# Patient Record
Sex: MALE | Race: WHITE
[De-identification: names, ages, dates, MRNs, and addresses within clinical notes are randomized per-mention and may not be internally consistent; named-entity substitution may affect disease eponyms.]

---

## 2017-02-13 ENCOUNTER — HOSPITAL ENCOUNTER (OUTPATIENT)
Dept: HOSPITAL 47 - LABWHC1 | Age: 62
Discharge: HOME | End: 2017-02-13
Payer: COMMERCIAL

## 2017-02-13 DIAGNOSIS — E78.2: Primary | ICD-10-CM

## 2017-02-13 LAB
ALP SERPL-CCNC: 100 U/L (ref 38–126)
ALT SERPL-CCNC: 30 U/L (ref 21–72)
ANION GAP SERPL CALC-SCNC: 9 MMOL/L
AST SERPL-CCNC: 20 U/L (ref 17–59)
BUN SERPL-SCNC: 17 MG/DL (ref 9–20)
CALCIUM SPEC-MCNC: 9.3 MG/DL (ref 8.4–10.2)
CHLORIDE SERPL-SCNC: 104 MMOL/L (ref 98–107)
CHOLEST SERPL-MCNC: 126 MG/DL (ref ?–200)
CO2 SERPL-SCNC: 30 MMOL/L (ref 22–30)
GLUCOSE SERPL-MCNC: 113 MG/DL (ref 74–99)
HDLC SERPL-MCNC: 47 MG/DL (ref 40–60)
NON-AFRICAN AMERICAN GFR(MDRD): >60
POTASSIUM SERPL-SCNC: 4 MMOL/L (ref 3.5–5.1)
PROT SERPL-MCNC: 6.4 G/DL (ref 6.3–8.2)
SODIUM SERPL-SCNC: 143 MMOL/L (ref 137–145)
TRIGL SERPL-MCNC: 88 MG/DL (ref ?–150)

## 2017-02-13 PROCEDURE — 80061 LIPID PANEL: CPT

## 2017-02-13 PROCEDURE — 36415 COLL VENOUS BLD VENIPUNCTURE: CPT

## 2017-02-13 PROCEDURE — 80053 COMPREHEN METABOLIC PANEL: CPT

## 2018-07-30 ENCOUNTER — HOSPITAL ENCOUNTER (OUTPATIENT)
Dept: HOSPITAL 47 - LABWHC1 | Age: 63
Discharge: HOME | End: 2018-07-30
Payer: COMMERCIAL

## 2018-07-30 DIAGNOSIS — E78.2: Primary | ICD-10-CM

## 2018-07-30 LAB
ALBUMIN SERPL-MCNC: 4.2 G/DL (ref 3.5–5)
ALP SERPL-CCNC: 85 U/L (ref 38–126)
ALT SERPL-CCNC: 32 U/L (ref 21–72)
ANION GAP SERPL CALC-SCNC: 8 MMOL/L
AST SERPL-CCNC: 27 U/L (ref 17–59)
BUN SERPL-SCNC: 16 MG/DL (ref 9–20)
CALCIUM SPEC-MCNC: 9.2 MG/DL (ref 8.4–10.2)
CHLORIDE SERPL-SCNC: 104 MMOL/L (ref 98–107)
CHOLEST SERPL-MCNC: 125 MG/DL (ref ?–200)
CO2 SERPL-SCNC: 29 MMOL/L (ref 22–30)
GLUCOSE SERPL-MCNC: 112 MG/DL (ref 74–99)
HDLC SERPL-MCNC: 41 MG/DL (ref 40–60)
LDLC SERPL CALC-MCNC: 67 MG/DL (ref 0–99)
POTASSIUM SERPL-SCNC: 3.9 MMOL/L (ref 3.5–5.1)
PROT SERPL-MCNC: 6.6 G/DL (ref 6.3–8.2)
SODIUM SERPL-SCNC: 141 MMOL/L (ref 137–145)
TRIGL SERPL-MCNC: 85 MG/DL (ref ?–150)

## 2018-07-30 PROCEDURE — 80061 LIPID PANEL: CPT

## 2018-07-30 PROCEDURE — 36415 COLL VENOUS BLD VENIPUNCTURE: CPT

## 2018-07-30 PROCEDURE — 80053 COMPREHEN METABOLIC PANEL: CPT

## 2018-09-17 ENCOUNTER — HOSPITAL ENCOUNTER (OUTPATIENT)
Dept: HOSPITAL 47 - LABPAT | Age: 63
Discharge: HOME | End: 2018-09-17
Payer: COMMERCIAL

## 2018-09-17 DIAGNOSIS — Z01.812: Primary | ICD-10-CM

## 2018-09-17 LAB
ERYTHROCYTE [DISTWIDTH] IN BLOOD BY AUTOMATED COUNT: 5.3 M/UL (ref 4.3–5.9)
ERYTHROCYTE [DISTWIDTH] IN BLOOD: 13.2 % (ref 11.5–15.5)
HCT VFR BLD AUTO: 48.6 % (ref 39–53)
HGB BLD-MCNC: 15.7 GM/DL (ref 13–17.5)
MCH RBC QN AUTO: 29.7 PG (ref 25–35)
MCHC RBC AUTO-ENTMCNC: 32.4 G/DL (ref 31–37)
MCV RBC AUTO: 91.8 FL (ref 80–100)
PLATELET # BLD AUTO: 315 K/UL (ref 150–450)
WBC # BLD AUTO: 8.3 K/UL (ref 3.8–10.6)

## 2018-09-17 PROCEDURE — 85027 COMPLETE CBC AUTOMATED: CPT

## 2018-09-17 PROCEDURE — 36415 COLL VENOUS BLD VENIPUNCTURE: CPT

## 2018-09-21 ENCOUNTER — HOSPITAL ENCOUNTER (OUTPATIENT)
Dept: HOSPITAL 47 - OR | Age: 63
LOS: 1 days | Discharge: HOME | End: 2018-09-22
Payer: COMMERCIAL

## 2018-09-21 VITALS — RESPIRATION RATE: 18 BRPM

## 2018-09-21 VITALS — BODY MASS INDEX: 31.9 KG/M2

## 2018-09-21 DIAGNOSIS — I25.5: ICD-10-CM

## 2018-09-21 DIAGNOSIS — N40.0: ICD-10-CM

## 2018-09-21 DIAGNOSIS — J44.9: ICD-10-CM

## 2018-09-21 DIAGNOSIS — Z88.6: ICD-10-CM

## 2018-09-21 DIAGNOSIS — K43.6: Primary | ICD-10-CM

## 2018-09-21 DIAGNOSIS — E78.5: ICD-10-CM

## 2018-09-21 DIAGNOSIS — I25.2: ICD-10-CM

## 2018-09-21 DIAGNOSIS — Z79.82: ICD-10-CM

## 2018-09-21 DIAGNOSIS — I11.9: ICD-10-CM

## 2018-09-21 DIAGNOSIS — Z86.73: ICD-10-CM

## 2018-09-21 DIAGNOSIS — I25.10: ICD-10-CM

## 2018-09-21 DIAGNOSIS — Z79.899: ICD-10-CM

## 2018-09-21 DIAGNOSIS — E66.9: ICD-10-CM

## 2018-09-21 DIAGNOSIS — Z79.02: ICD-10-CM

## 2018-09-21 DIAGNOSIS — G47.33: ICD-10-CM

## 2018-09-21 DIAGNOSIS — Z99.89: ICD-10-CM

## 2018-09-21 PROCEDURE — 49653: CPT

## 2018-09-21 RX ADMIN — TAMSULOSIN HYDROCHLORIDE SCH: 0.4 CAPSULE ORAL at 21:45

## 2018-09-21 RX ADMIN — HYDROCODONE BITARTRATE AND ACETAMINOPHEN PRN EACH: 5; 325 TABLET ORAL at 22:25

## 2018-09-21 NOTE — P.GSHP
History of Present Illness


H&P Date: 09/21/18











CHIEF COMPLAINT:  Ventral hernia





HISTORY OF PRESENT ILLNESS: The patient is a 63-year-old male who


presents with a history of swelling and pain along the abdomen from a hernia.  


Now he presents for surgical intervention.





PAST MEDICAL HISTORY: 


Please see list.





PAST SURGICAL HISTORY: 


Please see list.





MEDICATIONS: 


Please see list.





ALLERGIES:  Please see list. 





SOCIAL HISTORY: No illicit drug use





FAMILY HISTORY: No reports of Crohn disease or ulcerative colitis. 





REVIEW OF ORGAN SYSTEMS: 


CONSTITUTIONAL: No reports of fevers or chills. No reports of weight


loss despite prior attempts. 


GI:  Denies any blood in stools or constipation. 





PHYSICAL EXAM: 


VITAL SIGNS:  Stable


GENERAL: Well-developed pleasant male in no acute distress. 


HEENT: No scleral icterus. Extraocular movements grossly


intact. Moist buccal mucosa. 


NECK: Supple without lymphadenopathy. 


CHEST: Unlabored respirations. Equal bilateral excursions. 


CARDIOVASCULAR: Regular rate and rhythm. Distal 2+ pulses. 


ABDOMEN: Soft, nondistended.  Palpable defect of the abdomen.  No peritoneal 

signs.


MUSCULOSKELETAL: No clubbing, cyanosis, or edema. 





ASSESSMENT: 


1.  Ventral hernia





PLAN: 


1.  Recommend proceeding with robotic ventral hernia repair with mesh.


2.  Benefits and risks of surgical intervention was discussed including 

possibility of open technique.


3.  DVT prophylaxis.


4.  Antibiotic prophylaxis.





Past Medical History


Past Medical History: Coronary Artery Disease (CAD), CVA/TIA, Hyperlipidemia, 

Hypertension, Myocardial Infarction (MI)


Additional Past Medical History / Comment(s): AUG 28, 14.


Last Myocardial Infarction Date:: UNKNOWN


History of Any Multi-Drug Resistant Organisms: None Reported


Past Surgical History: Tonsillectomy


Past Anesthesia/Blood Transfusion Reactions: No Reported Reaction


Past Alcohol Use History: None Reported





- Past Family History


  ** Mother


Family Medical History: Deep Vein Thrombosis (DVT)





Medications and Allergies


 Home Medications











 Medication  Instructions  Recorded  Confirmed  Type


 


Aspirin EC [Ecotrin] 81 mg PO HS 09/30/14 09/21/18 History


 


Atorvastatin [Lipitor] 20 mg PO HS 09/30/14 09/21/18 History


 


Losartan Potassium 100 mg PO QAM 09/30/14 09/21/18 History


 


Metoprolol Succinate 25 mg PO QAM 09/30/14 09/21/18 History


 


amLODIPine BESYLATE [Norvasc] 10 mg PO HS 09/30/14 09/21/18 History


 


Cholecalciferol (Vitamin D3) 2,000 unit PO DAILY 05/01/15 09/21/18 History





[Vitamin D]    


 


Tamsulosin HCl 0.4 mg PO DAILY 05/01/15 09/21/18 History


 


Ubidecarenone [Co Q-10] 100 mg PO HS 05/01/15 09/21/18 History


 


Vitamin B Complex 1 cap PO DAILY 05/01/15 09/21/18 History


 


Clopidogrel [Plavix] 75 mg PO DAILY 09/13/18 09/21/18 History


 


Glucosamine Sulfate 500 mg PO DAILY 09/13/18 09/21/18 History


 


Isosorbide Mononitrate [Isosorbide 30 mg PO DAILY 09/13/18 09/21/18 History





Mononitrate ER]    


 


Updraft DAILY PRN 09/21/18  History











 Allergies











Allergy/AdvReac Type Severity Reaction Status Date / Time


 


naproxen sodium [From Aleve] Allergy  Rash/Hives Verified 09/21/18 06:14














Surgical - Exam


 Vital Signs











Temp Pulse Resp BP Pulse Ox


 


 97.8 F   59 L  16   102/69   95 


 


 09/21/18 06:10  09/21/18 06:10  09/21/18 06:10  09/21/18 06:10  09/21/18 06:10

## 2018-09-21 NOTE — P.OP
Date of Procedure: 09/21/18


Description of Procedure: 








SURGEON:  HILARY AYALA MD





PREOPERATIVE DIAGNOSES:  


1.  Initial umbilical ventral hernia


2.  Ischemic cardiomyopathy


3.  Chronic obstructive pulmonary disease


4.  Hypertensive heart disease


5.  Prostatic hypertrophy disorder


6.  Hyperlipidemia


7.  Obesity due to excess calories, BMI 32.0





POSTOPERATIVE DIAGNOSES:  


1.  Initial umbilical ventral hernia, incarcerated 3 cm.


2.  Ischemic cardiomyopathy


3.  Chronic obstructive pulmonary disease


4.  Hypertensive heart disease


5.  Prostatic hypertrophy disorder


6.  Hyperlipidemia


7.  Obesity due to excess calories, BMI 32.0





OPERATION:       


1.  Robotic-assisted da Luis Xi laparoscopic repair of 3 cm initial 

incarcerated umbilical ventral hernia mesh, ventralight ST mesh 11.4 cm





ANESTHESIA: General with local


ESTIMATED BLOOD LOSS:  5 mL.


SPECIMENS: None.


COMPLICATIONS:  None. 





INDICATIONS: The patient is a 63-year-old male who presents with pain 


along the epigastrium including periumbilical pain. Surgical intervention with 


laparoscopic versus robotic and open techniques were reviewed. 


Placement of mesh was also reviewed. Benefits and risks were thoroughly 


described. Informed consent was obtained.  





DESCRIPTION OF PROCEDURE: The patient was brought into the operating 


room and laid in supine position. After general induction, the abdomen 


had been prepped and draped in standard sterile fashion. Ioban draping 


was also placed. Prior to incision, a timeout protocol was confirmed 


with surgical team regarding the patient's name including procedures to be 


performed. The robot was primed prior to the procedure.  





A field block using local anesthetis was placed along hernia site including the 

proposed port sites.





Initial incision was made with an  #11 blade along the left upper quadrant. A 0 

degree 5 mm laparoscopic trocar 


entry was performed. An incarcerated ventral hernia of the umbilicus was 

identified.


A 8 mm trocar was placed along the right lateral abdominal wall approximately 

12 cm lateral to the lower midline. 


An 8 mm port was placed along the right lower quadrant under direct 

localization. An 8-mm port was along the right lateral mid-abdominal wall.  

Placements of the ports were 15 cm from the target anatomy and 10 cm apart.  





The [employer] robot was previously primed, prepped and draped then docked 

along the left side of the patient.





I then sat at the robot [employer] console where working arms of the robot


including Bovie cautery connected to robotic scissors, needle , and 

graspers placed by the assistant.





The incarcerated contents was reduced as the peritoneal fat was cleaned from 

the abdominal wall.


Next, hemostasis was checked with cautery.  The hernia defect was oversewn 

using #1 Stratafix with imbrication 3.





Next, ventralight ST mesh 11.4 cm was placed with the rough side towards the 

abdominal wall.  2-0 VLOC 9 inch sutures were used to fixate the mesh. 





A final endoscopic imaging was obtained. 





All instruments and pneumoperitoneum were evacuated from the abdominal cavity.  





The da Luis Xi robot was undocked from the patient. I re-scrubbed into the 

case for closure of incisions.  





The incisions were reapproximated using 4-0 Monocryl in an  interrupted 

subcuticular fashion. Liquid glue was applied to the skin after cleansing the 

skin with normal saline and dilute hydrogen peroxide.





A dressing was placed at the umbilicus ith three(3) 4 x 4 gauze followed by 

Tegaderm.  An abdominal binder was placed. 





At the end of the procedure, needle, sponge, and instrument count had 


been verified correct by surgical technician. The patient was taken to 


the postanesthesia care unit in stable condition.





FINDINGS: 


1.  Ventral/Umbilical incarcerated hernia of the epigastrium, 3 x 3 cm


2.  Console time 28 minutes





Plan - Discharge Summary


New Discharge Prescriptions: 


No Action


   Aspirin EC [Ecotrin] 81 mg PO HS


   amLODIPine BESYLATE [Norvasc] 10 mg PO HS


   Atorvastatin [Lipitor] 20 mg PO HS


   Metoprolol Succinate 25 mg PO QAM


   Losartan Potassium 100 mg PO QAM


   Tamsulosin HCl 0.4 mg PO DAILY


   Vitamin B Complex 1 cap PO DAILY


   Ubidecarenone [Co Q-10] 100 mg PO HS


   Cholecalciferol (Vitamin D3) [Vitamin D] 2,000 unit PO DAILY


   Isosorbide Mononitrate [Isosorbide Mononitrate ER] 30 mg PO DAILY


   Clopidogrel [Plavix] 75 mg PO DAILY


   Glucosamine Sulfate 500 mg PO DAILY


   Updraft DAILY PRN


     PRN Reason: Dyspnea


Discharge Medication List





Aspirin EC [Ecotrin] 81 mg PO HS 09/30/14 [History]


Atorvastatin [Lipitor] 20 mg PO HS 09/30/14 [History]


Losartan Potassium 100 mg PO QAM 09/30/14 [History]


Metoprolol Succinate 25 mg PO QAM 09/30/14 [History]


amLODIPine BESYLATE [Norvasc] 10 mg PO HS 09/30/14 [History]


Cholecalciferol (Vitamin D3) [Vitamin D] 2,000 unit PO DAILY 05/01/15 [History]


Tamsulosin HCl 0.4 mg PO DAILY 05/01/15 [History]


Ubidecarenone [Co Q-10] 100 mg PO HS 05/01/15 [History]


Vitamin B Complex 1 cap PO DAILY 05/01/15 [History]


Clopidogrel [Plavix] 75 mg PO DAILY 09/13/18 [History]


Glucosamine Sulfate 500 mg PO DAILY 09/13/18 [History]


Isosorbide Mononitrate [Isosorbide Mononitrate ER] 30 mg PO DAILY 09/13/18 [

History]


Updraft DAILY PRN 09/21/18 [History]

## 2018-09-22 VITALS — HEART RATE: 80 BPM

## 2018-09-22 VITALS — TEMPERATURE: 97.8 F | DIASTOLIC BLOOD PRESSURE: 83 MMHG | SYSTOLIC BLOOD PRESSURE: 121 MMHG

## 2018-09-22 RX ADMIN — HYDROCODONE BITARTRATE AND ACETAMINOPHEN PRN EACH: 5; 325 TABLET ORAL at 13:30

## 2018-09-22 RX ADMIN — TAMSULOSIN HYDROCHLORIDE SCH MG: 0.4 CAPSULE ORAL at 09:57

## 2018-09-22 RX ADMIN — HYDROCODONE BITARTRATE AND ACETAMINOPHEN PRN EACH: 5; 325 TABLET ORAL at 06:27

## 2018-09-22 NOTE — P.DS
Providers


Date of admission: 





09/21/18


Expected date of discharge: 09/22/18


Attending physician: 


Hillary Cyr





Primary care physician: 


Brian Ramon








- Discharge Diagnosis(es)


(1) Incarcerated ventral hernia


Current Visit: Yes   Status: Acute   





(2) COPD (chronic obstructive pulmonary disease)


Current Visit: Yes   Status: Acute   





(3) Prostate disease


Current Visit: Yes   Status: Acute   





(4) Ischemic cardiomyopathy


Current Visit: Yes   Status: Acute   


Hospital Course: 





POSTOPERATIVE DIAGNOSES:  


1.  Initial umbilical ventral hernia, incarcerated 3 cm.


2.  Ischemic cardiomyopathy


3.  Chronic obstructive pulmonary disease


4.  Hypertensive heart disease


5.  Prostatic hypertrophy disorder


6.  Hyperlipidemia


7.  Obesity due to excess calories, BMI 32.0





COURSE: The patient is a 63-year-old male who presents with pain 


along the epigastrium including periumbilical pain. Surgical intervention with 


laparoscopic versus robotic and open techniques were reviewed. 


Placement of mesh was also reviewed. Benefits and risks were thoroughly 


described. Informed consent was obtained.  





Postprocedure, his pain was poorly controlled.  He also had difficulty voiding.

  He was placed in outpatient status for prolonged postoperative recovery.  

Prior to discharge, he was voiding spontaneously.  Patient has history of 

chronic COPD.  He will follow up with his primary care provider as well.  Follow

-up in the office 3 days.








Procedures: 








OPERATION:       


1.  Robotic-assisted da Luis Xi laparoscopic repair of 3 cm initial 

incarcerated umbilical ventral hernia mesh, ventralight ST mesh 11.4 cm





ANESTHESIA: General with local


ESTIMATED BLOOD LOSS:  5 mL.


SPECIMENS: None.


COMPLICATIONS:  None. 





Patient Condition at Discharge: Stable





Plan - Discharge Summary


Discharge Rx Participant: Yes


New Discharge Prescriptions: 


New


   HYDROcodone/APAP 5-325MG [Norco 5-325] 1 tab PO Q6HR PRN 3 Days #12 tab


     PRN Reason: Pain





No Action


   Aspirin EC [Ecotrin] 81 mg PO HS


   amLODIPine BESYLATE [Norvasc] 10 mg PO HS


   Atorvastatin [Lipitor] 20 mg PO HS


   Metoprolol Succinate 25 mg PO QAM


   Losartan Potassium 100 mg PO QAM


   Tamsulosin HCl 0.4 mg PO DAILY


   Vitamin B Complex 1 cap PO DAILY


   Ubidecarenone [Co Q-10] 100 mg PO HS


   Cholecalciferol (Vitamin D3) [Vitamin D] 2,000 unit PO DAILY


   Isosorbide Mononitrate [Isosorbide Mononitrate ER] 30 mg PO DAILY


   Clopidogrel [Plavix] 75 mg PO DAILY


   Glucosamine Sulfate 500 mg PO DAILY


   Updraft DAILY PRN


     PRN Reason: Dyspnea


Discharge Medication List





Aspirin EC [Ecotrin] 81 mg PO HS 09/30/14 [History]


Atorvastatin [Lipitor] 20 mg PO HS 09/30/14 [History]


Losartan Potassium 100 mg PO QAM 09/30/14 [History]


Metoprolol Succinate 25 mg PO QAM 09/30/14 [History]


amLODIPine BESYLATE [Norvasc] 10 mg PO HS 09/30/14 [History]


Cholecalciferol (Vitamin D3) [Vitamin D] 2,000 unit PO DAILY 05/01/15 [History]


Tamsulosin HCl 0.4 mg PO DAILY 05/01/15 [History]


Ubidecarenone [Co Q-10] 100 mg PO HS 05/01/15 [History]


Vitamin B Complex 1 cap PO DAILY 05/01/15 [History]


Clopidogrel [Plavix] 75 mg PO DAILY 09/13/18 [History]


Glucosamine Sulfate 500 mg PO DAILY 09/13/18 [History]


Isosorbide Mononitrate [Isosorbide Mononitrate ER] 30 mg PO DAILY 09/13/18 [

History]


HYDROcodone/APAP 5-325MG [Norco 5-325] 1 tab PO Q6HR PRN 3 Days #12 tab 09/21/ 18 [Rx]


Updraft DAILY PRN 09/21/18 [History]








Follow up Appointment(s)/Referral(s): 


Hillary Cyr MD [STAFF PHYSICIAN] - 09/24/18


Patient Instructions/Handouts:  How to Use an Incentive Spirometer (ED), How to 

Use an Incentive Spirometer (DC), How to Use an Incentive Spirometer (GEN), 

Laparoscopic Herniorrhaphy (DC), Abdominal Binder (DC)


Activity/Diet/Wound Care/Special Instructions: 


No lifting for 4 pounds in 4 weeks.  May shower. Do not remove dressing.  Wear 

abdominal binder at all times except for showering.


Discharge Disposition: HOME SELF-CARE

## 2018-09-22 NOTE — P.PN
Subjective


Progress Note Date: 09/21/18











HISTORY OF PRESENT ILLNESS:  The patient is a 63-year-old gentleman who had a 

ventral hernia repair.  Discharge was held secondary to inability to urinate 

and intraoperative low tidal volume upon induction of anesthesia.  Patient has 

history of COPD Baseline.  At the time of my assessment, pain was controlled.  

Patient was pending spontaneously voidance of urine.  He has history of 

prostate disorder.  





PHYSICAL EXAM: 


GENERAL: Well-developed in no acute distress.  


HEENT: No scleral icterus.  Extraocular movements grossly intact.  Hears 

conversational speech.  No nasal drainage.


NECK: Supple without lymphadenopathy. 


CHEST: Nonlabored respirations with equal bilateral excursions. 


CARDIOVASCULAR: Regular rate and regular rhythm. Distal 2+ pulses. 


ABDOMEN: Obese, soft, nontender, nondistended. 


MUSCULOSKELETAL: No clubbing, cyanosis. Gross strength 5/5 distal lower 

extremities. 2+ pre-tibial pitting edema.


NEURO: No focal or lateralizing signs. Cranial nerves 2 through 12 grossly 

within normal limits. 


PSYCH: Appropriate affect. Alert and oriented to person, place and time.





ASSESSMENT:


1.  Status post ventral hernia repair, robotic


2.  Moderate to severe COPD


3.  Prostatic disorder





PLAN:


1.  Discharge home pending spontaneous void


2.  Pulmonary consultation obtained for moderate severe COPD





Objective





- Vital Signs


Vital signs: 


 Vital Signs











Temp  97.6 F   09/21/18 23:26


 


Pulse  80   09/21/18 23:26


 


Resp  18   09/21/18 23:26


 


BP  136/82   09/21/18 23:26


 


Pulse Ox  93 L  09/21/18 23:26








 Intake & Output











 09/21/18 09/22/18 09/22/18





 18:59 06:59 18:59


 


Intake Total 3588 1750 


 


Output Total 505 2980 


 


Balance 3083 -1230 


 


Weight 87.09 kg  


 


Intake:   


 


  IV 3050  


 


  Intake, IV Titration  1750 





  Amount   


 


    Sodium Chloride 0.9% 1,  750 





    000 ml @ 50 mls/hr IV .   





    Q20H [facility] Rx#:[id number]   


 


    Sodium Chloride 0.9% 1,  1000 





    000 ml @ 999 mls/hr IV .   





    Q1H1M [facility] Rx#:[id number]   


 


  Oral 538  


 


Output:   


 


  Urine 500 2980 


 


    Straight 500  


 


  Estimated Blood Loss 5  


 


Other:   


 


  # Voids  4

## 2019-02-21 ENCOUNTER — HOSPITAL ENCOUNTER (OUTPATIENT)
Dept: HOSPITAL 47 - LABWHC1 | Age: 64
Discharge: HOME | End: 2019-02-21
Attending: INTERNAL MEDICINE
Payer: COMMERCIAL

## 2019-02-21 DIAGNOSIS — E78.2: Primary | ICD-10-CM

## 2019-02-21 LAB
ALBUMIN SERPL-MCNC: 4.1 G/DL (ref 3.8–4.9)
ALBUMIN/GLOB SERPL: 2.16 G/DL (ref 1.6–3.17)
ALP SERPL-CCNC: 105 U/L (ref 41–126)
ALT SERPL-CCNC: 23 U/L (ref 10–49)
ANION GAP SERPL CALC-SCNC: 9.9 MMOL/L (ref 4–12)
AST SERPL-CCNC: 26 U/L (ref 14–35)
BUN SERPL-SCNC: 18 MG/DL (ref 9–27)
CALCIUM SPEC-MCNC: 9.5 MG/DL (ref 8.7–10.3)
CHLORIDE SERPL-SCNC: 102 MMOL/L (ref 96–109)
CHOLEST SERPL-MCNC: 142 MG/DL (ref 0–200)
CO2 SERPL-SCNC: 26.1 MMOL/L (ref 21.6–31.8)
GLOBULIN SER CALC-MCNC: 1.9 G/DL (ref 1.6–3.3)
GLUCOSE SERPL-MCNC: 96 MG/DL (ref 70–110)
HDLC SERPL-MCNC: 40 MG/DL (ref 40–60)
LDLC SERPL CALC-MCNC: 75.6 MG/DL (ref 0–131)
POTASSIUM SERPL-SCNC: 4.5 MMOL/L (ref 3.5–5.5)
PROT SERPL-MCNC: 6 G/DL (ref 6.2–8.2)
SODIUM SERPL-SCNC: 138 MMOL/L (ref 135–145)
TRIGL SERPL-MCNC: 132 MG/DL (ref 0–149)
VLDLC SERPL CALC-MCNC: 26.4 MG/DL (ref 5–40)

## 2019-02-21 PROCEDURE — 80053 COMPREHEN METABOLIC PANEL: CPT

## 2019-02-21 PROCEDURE — 36415 COLL VENOUS BLD VENIPUNCTURE: CPT

## 2019-02-21 PROCEDURE — 80061 LIPID PANEL: CPT

## 2019-06-20 ENCOUNTER — HOSPITAL ENCOUNTER (OUTPATIENT)
Dept: HOSPITAL 47 - LABWHC1 | Age: 64
Discharge: HOME | End: 2019-06-20
Attending: INTERNAL MEDICINE
Payer: COMMERCIAL

## 2019-06-20 DIAGNOSIS — E78.2: Primary | ICD-10-CM

## 2019-06-20 LAB
ALT SERPL-CCNC: 24 U/L (ref 10–49)
AST SERPL-CCNC: 27 U/L (ref 14–35)
CHOLEST SERPL-MCNC: 134 MG/DL (ref 0–200)
HDLC SERPL-MCNC: 43 MG/DL (ref 40–60)
LDLC SERPL CALC-MCNC: 76.4 MG/DL (ref 0–131)
TRIGL SERPL-MCNC: 73 MG/DL (ref 0–149)
VLDLC SERPL CALC-MCNC: 14.6 MG/DL (ref 5–40)

## 2019-06-20 PROCEDURE — 84460 ALANINE AMINO (ALT) (SGPT): CPT

## 2019-06-20 PROCEDURE — 36415 COLL VENOUS BLD VENIPUNCTURE: CPT

## 2019-06-20 PROCEDURE — 84450 TRANSFERASE (AST) (SGOT): CPT

## 2019-06-20 PROCEDURE — 80061 LIPID PANEL: CPT

## 2019-09-14 ENCOUNTER — HOSPITAL ENCOUNTER (EMERGENCY)
Dept: HOSPITAL 47 - EC | Age: 64
Discharge: HOME | End: 2019-09-14
Payer: COMMERCIAL

## 2019-09-14 VITALS
HEART RATE: 57 BPM | SYSTOLIC BLOOD PRESSURE: 124 MMHG | TEMPERATURE: 97.9 F | DIASTOLIC BLOOD PRESSURE: 79 MMHG | RESPIRATION RATE: 18 BRPM

## 2019-09-14 DIAGNOSIS — I25.2: ICD-10-CM

## 2019-09-14 DIAGNOSIS — Z79.02: ICD-10-CM

## 2019-09-14 DIAGNOSIS — I25.10: ICD-10-CM

## 2019-09-14 DIAGNOSIS — Z88.6: ICD-10-CM

## 2019-09-14 DIAGNOSIS — S83.91XA: Primary | ICD-10-CM

## 2019-09-14 DIAGNOSIS — Z86.73: ICD-10-CM

## 2019-09-14 DIAGNOSIS — Z79.82: ICD-10-CM
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
## 2019-09-14 DIAGNOSIS — I10: ICD-10-CM

## 2019-09-14 DIAGNOSIS — E78.5: ICD-10-CM

## 2019-09-14 DIAGNOSIS — Z87.891: ICD-10-CM
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
## 2019-09-14 DIAGNOSIS — Z79.899: ICD-10-CM

## 2019-09-14 PROCEDURE — 99283 EMERGENCY DEPT VISIT LOW MDM: CPT

## 2019-09-14 PROCEDURE — 73562 X-RAY EXAM OF KNEE 3: CPT

## 2019-09-14 PROCEDURE — 96372 THER/PROPH/DIAG INJ SC/IM: CPT

## 2019-09-14 NOTE — XR
EXAMINATION TYPE: XR knee complete RT , 3 VIEWS

 

DATE OF EXAM ORDERED: 9/14/2019

 

HISTORY: twist/fallpain.

 

COMPARISON: None.

 

FINDINGS:  No fracture or dislocation is seen. There is fullness in the suprapatellar region suggesti
ve of an effusion.

 

IMPRESSION: 

1. NO ACUTE OSSEOUS LESION.

2. I SUSPECT A SMALL JOINT EFFUSION.

## 2019-09-14 NOTE — ED
General Adult HPI





- General


Chief complaint: Fall


Stated complaint: fall, knee pain


Time Seen by Provider: 09/14/19 11:12


Source: patient, RN notes reviewed


Mode of arrival: ambulatory


Limitations: no limitations





- History of Present Illness


Initial comments: 





Patient is a pleasant 64-year-old male presents emergency Department with 

complaints of right knee discomfort.  Patient had just slipped and fallen 

yesterday.  Patient slipped on wet floor going down the stairs.  Patient twisted

his knee.  Patient does not believe she really landed on his knee.  Patient did 

lightly strike his head.  No headache.  No loss of consciousness.  No blood 

thinners.  No weakness or confusion.  Patient states he is able to ambulate on 

his right knee however is painful.  No history of significant injury to this 

area previously.  Discomfort is mild at rest but severe with movement.





- Related Data


                                Home Medications











 Medication  Instructions  Recorded  Confirmed


 


Aspirin EC [Ecotrin] 81 mg PO HS 09/30/14 09/14/19


 


Atorvastatin [Lipitor] 20 mg PO HS 09/30/14 09/14/19


 


Losartan Potassium 100 mg PO HS 09/30/14 09/14/19


 


Metoprolol Succinate 25 mg PO HS 09/30/14 09/14/19


 


amLODIPine BESYLATE [Norvasc] 10 mg PO HS 09/30/14 09/14/19


 


Cholecalciferol (Vitamin D3) 2,000 unit PO HS 05/01/15 09/14/19





[Vitamin D]   


 


Tamsulosin HCl 0.4 mg PO HS 05/01/15 09/14/19


 


Ubidecarenone [Co Q-10] 100 mg PO HS 05/01/15 09/14/19


 


Vitamin B Complex 1 cap PO HS 05/01/15 09/14/19


 


Clopidogrel [Plavix] 75 mg PO DAILY 09/13/18 09/21/18


 


Glucosamine Sulfate 500 mg PO DAILY 09/13/18 09/21/18


 


Isosorbide Mononitrate [Isosorbide 30 mg PO HS 09/13/18 09/14/19





Mononitrate ER]   


 


Updraft DAILY PRN 09/21/18 


 


Tiotropium Br/Olodaterol HCl 1 spray INHALATION RT-BID 09/14/19 09/14/19





[Stiolto Respimat Inhal Spray]   








                                  Previous Rx's











 Medication  Instructions  Recorded


 


HYDROcodone/APAP 5-325MG [Norco 1 tab PO Q6HR PRN 3 Days #12 tab 09/21/18





5-325]  


 


Ibuprofen [Motrin] 600 mg PO Q6HR PRN #20 tab 09/14/19











                                    Allergies











Allergy/AdvReac Type Severity Reaction Status Date / Time


 


naproxen sodium [From Aleve] Allergy  Rash/Hives Verified 09/14/19 11:48














Review of Systems


ROS Statement: 


Those systems with pertinent positive or pertinent negative responses have been 

documented in the HPI.





ROS Other: All systems not noted in ROS Statement are negative.


Constitutional: Denies: fever


Eyes: Denies: eye pain


ENT: Denies: ear pain


Respiratory: Denies: cough


Cardiovascular: Denies: chest pain


Endocrine: Denies: fatigue


Gastrointestinal: Denies: abdominal pain


Genitourinary: Denies: dysuria


Musculoskeletal: Denies: back pain


Skin: Denies: rash


Neurological: Denies: weakness





Past Medical History


Past Medical History: Coronary Artery Disease (CAD), CVA/TIA, Hyperlipidemia, 

Hypertension, Myocardial Infarction (MI)


Additional Past Medical History / Comment(s): AUG 28, 14.


Last Myocardial Infarction Date:: UNKNOWN


History of Any Multi-Drug Resistant Organisms: None Reported


Past Surgical History: Tonsillectomy


Additional Past Surgical History / Comment(s): ventral hernia repair


Past Anesthesia/Blood Transfusion Reactions: No Reported Reaction


Past Psychological History: No Psychological Hx Reported


Smoking Status: Former smoker


Past Alcohol Use History: None Reported


Past Drug Use History: None Reported





- Past Family History


  ** Mother


Family Medical History: Deep Vein Thrombosis (DVT)





General Exam


Limitations: no limitations


General appearance: alert, in no apparent distress


Head exam: Present: atraumatic


Eye exam: Present: normal appearance


Neck exam: Present: normal inspection.  Absent: tenderness


Respiratory exam: Present: normal lung sounds bilaterally


Cardiovascular Exam: Present: regular rate, normal rhythm


  ** Expanded


Peripheral pulses: 2+: Posterior Tibialis (R), Dorsalis Pedis (R)


GI/Abdominal exam: Present: soft.  Absent: tenderness


Extremities exam: Present: other (Stable on exam.  Mild swelling.  Moderate 

tenderness.  Distally the extremity is neurovascular intact.)


  ** Right


Knee exam: Present: tenderness (Moderate tenderness), swelling (Mild swelling). 

Absent: posterior draw sign, pain/laxity with valgus, pain/laxity with varus


Neurological exam: Present: alert.  Absent: motor sensory deficit


Psychiatric exam: Present: normal affect, normal mood


Skin exam: Present: normal color





Course


                                   Vital Signs











  09/14/19





  11:04


 


Temperature 97.9 F


 


Pulse Rate 57 L


 


Respiratory 18





Rate 


 


Blood Pressure 124/79


 


O2 Sat by Pulse 95





Oximetry 














Medical Decision Making





- Medical Decision Making





Patient reevaluated.  Patient and family updated.





- Radiology Data


Radiology results: image reviewed (X-ray of the right knee shows possible small 

effusion, no fracture.)





Disposition


Clinical Impression: 


 Fall, Knee sprain





Disposition: HOME SELF-CARE


Condition: Stable


Instructions (If sedation given, give patient instructions):  Knee Sprain (ED)


Additional Instructions: 


Ice to affected area.  Limit weightbearing.  Use knee immobilizer.  Please 

follow-up with primary care physician in the next couple days for recheck.  If 

symptoms continue consider orthopedic evaluation.  Cannot completely exclude 

injury to the cartilage or ligaments at this time.  Over-the-counter anti-

inflammatories as needed.  Return for increased pain, swelling, fever, redness, 

worsening symptoms or other concerns.





Motrin 600 prescription has been sent to [facility] in [place]


Prescriptions: 


Ibuprofen [Motrin] 600 mg PO Q6HR PRN #20 tab


 PRN Reason: Pain


Is patient prescribed a controlled substance at d/c from ED?: No


Referrals: 


Brian Ramon MD [Primary Care Provider] - 1-2 days


Time of Disposition: 12:46

## 2019-12-16 ENCOUNTER — HOSPITAL ENCOUNTER (OUTPATIENT)
Dept: HOSPITAL 47 - LABWHC1 | Age: 64
Discharge: HOME | End: 2019-12-16
Attending: NURSE PRACTITIONER
Payer: COMMERCIAL

## 2019-12-16 DIAGNOSIS — I25.5: ICD-10-CM

## 2019-12-16 DIAGNOSIS — E78.2: ICD-10-CM

## 2019-12-16 DIAGNOSIS — I10: Primary | ICD-10-CM

## 2019-12-16 LAB
ALBUMIN SERPL-MCNC: 4.3 G/DL (ref 3.8–4.9)
ALBUMIN/GLOB SERPL: 2.53 G/DL (ref 1.6–3.17)
ALP SERPL-CCNC: 103 U/L (ref 41–126)
ALT SERPL-CCNC: 26 U/L (ref 10–49)
ANION GAP SERPL CALC-SCNC: 6.8 MMOL/L (ref 4–12)
AST SERPL-CCNC: 31 U/L (ref 14–35)
BUN SERPL-SCNC: 19 MG/DL (ref 9–27)
BUN/CREAT SERPL: 19 RATIO (ref 12–20)
CALCIUM SPEC-MCNC: 9 MG/DL (ref 8.7–10.3)
CHLORIDE SERPL-SCNC: 105 MMOL/L (ref 96–109)
CHOLEST SERPL-MCNC: 135 MG/DL (ref 0–200)
CO2 SERPL-SCNC: 29.2 MMOL/L (ref 21.6–31.8)
GLOBULIN SER CALC-MCNC: 1.7 G/DL (ref 1.6–3.3)
GLUCOSE SERPL-MCNC: 108 MG/DL (ref 70–110)
HDLC SERPL-MCNC: 44 MG/DL (ref 40–60)
LDLC SERPL CALC-MCNC: 69.6 MG/DL (ref 0–131)
POTASSIUM SERPL-SCNC: 3.9 MMOL/L (ref 3.5–5.5)
PROT SERPL-MCNC: 6 G/DL (ref 6.2–8.2)
SODIUM SERPL-SCNC: 141 MMOL/L (ref 135–145)
TRIGL SERPL-MCNC: 107 MG/DL (ref 0–149)
VLDLC SERPL CALC-MCNC: 21.4 MG/DL (ref 5–40)

## 2019-12-16 PROCEDURE — 36415 COLL VENOUS BLD VENIPUNCTURE: CPT

## 2019-12-16 PROCEDURE — 80061 LIPID PANEL: CPT

## 2019-12-16 PROCEDURE — 80053 COMPREHEN METABOLIC PANEL: CPT

## 2021-04-13 ENCOUNTER — HOSPITAL ENCOUNTER (OUTPATIENT)
Dept: HOSPITAL 47 - EC | Age: 66
Setting detail: OBSERVATION
LOS: 2 days | Discharge: HOME | End: 2021-04-15
Attending: HOSPITALIST | Admitting: HOSPITALIST
Payer: MEDICARE

## 2021-04-13 DIAGNOSIS — I25.10: ICD-10-CM

## 2021-04-13 DIAGNOSIS — I25.2: ICD-10-CM

## 2021-04-13 DIAGNOSIS — N40.0: ICD-10-CM

## 2021-04-13 DIAGNOSIS — Z79.899: ICD-10-CM

## 2021-04-13 DIAGNOSIS — E11.9: ICD-10-CM

## 2021-04-13 DIAGNOSIS — R07.89: Primary | ICD-10-CM

## 2021-04-13 DIAGNOSIS — Z86.73: ICD-10-CM

## 2021-04-13 DIAGNOSIS — Z79.84: ICD-10-CM

## 2021-04-13 DIAGNOSIS — E66.9: ICD-10-CM

## 2021-04-13 DIAGNOSIS — J43.9: ICD-10-CM

## 2021-04-13 DIAGNOSIS — I07.1: ICD-10-CM

## 2021-04-13 DIAGNOSIS — Z20.822: ICD-10-CM

## 2021-04-13 DIAGNOSIS — R60.0: ICD-10-CM

## 2021-04-13 DIAGNOSIS — I10: ICD-10-CM

## 2021-04-13 DIAGNOSIS — Z87.891: ICD-10-CM

## 2021-04-13 DIAGNOSIS — E78.5: ICD-10-CM

## 2021-04-13 DIAGNOSIS — Z98.890: ICD-10-CM

## 2021-04-13 DIAGNOSIS — Z82.49: ICD-10-CM

## 2021-04-13 DIAGNOSIS — Z88.6: ICD-10-CM

## 2021-04-13 LAB
ALBUMIN SERPL-MCNC: 4.6 G/DL (ref 3.5–5)
ALP SERPL-CCNC: 107 U/L (ref 38–126)
ALT SERPL-CCNC: 16 U/L (ref 4–49)
ANION GAP SERPL CALC-SCNC: 9 MMOL/L
APTT BLD: 23.9 SEC (ref 22–30)
AST SERPL-CCNC: 26 U/L (ref 17–59)
BASOPHILS # BLD AUTO: 0.1 K/UL (ref 0–0.2)
BASOPHILS NFR BLD AUTO: 1 %
BUN SERPL-SCNC: 15 MG/DL (ref 9–20)
CALCIUM SPEC-MCNC: 9.8 MG/DL (ref 8.4–10.2)
CHLORIDE SERPL-SCNC: 100 MMOL/L (ref 98–107)
CK SERPL-CCNC: 216 U/L (ref 55–170)
CO2 SERPL-SCNC: 31 MMOL/L (ref 22–30)
EOSINOPHIL # BLD AUTO: 0.4 K/UL (ref 0–0.7)
EOSINOPHIL NFR BLD AUTO: 5 %
ERYTHROCYTE [DISTWIDTH] IN BLOOD BY AUTOMATED COUNT: 5.35 M/UL (ref 4.3–5.9)
ERYTHROCYTE [DISTWIDTH] IN BLOOD: 13.4 % (ref 11.5–15.5)
GLUCOSE BLD-MCNC: 140 MG/DL (ref 75–99)
GLUCOSE SERPL-MCNC: 93 MG/DL (ref 74–99)
HCT VFR BLD AUTO: 47.9 % (ref 39–53)
HGB BLD-MCNC: 16.5 GM/DL (ref 13–17.5)
INR PPP: 0.9 (ref ?–1.2)
LYMPHOCYTES # SPEC AUTO: 1.6 K/UL (ref 1–4.8)
LYMPHOCYTES NFR SPEC AUTO: 21 %
MAGNESIUM SPEC-SCNC: 2 MG/DL (ref 1.6–2.3)
MCH RBC QN AUTO: 30.9 PG (ref 25–35)
MCHC RBC AUTO-ENTMCNC: 34.5 G/DL (ref 31–37)
MCV RBC AUTO: 89.6 FL (ref 80–100)
MONOCYTES # BLD AUTO: 0.7 K/UL (ref 0–1)
MONOCYTES NFR BLD AUTO: 9 %
NEUTROPHILS # BLD AUTO: 5 K/UL (ref 1.3–7.7)
NEUTROPHILS NFR BLD AUTO: 63 %
PLATELET # BLD AUTO: 270 K/UL (ref 150–450)
POTASSIUM SERPL-SCNC: 4.1 MMOL/L (ref 3.5–5.1)
PROT SERPL-MCNC: 7.4 G/DL (ref 6.3–8.2)
PT BLD: 9.6 SEC (ref 9–12)
SODIUM SERPL-SCNC: 140 MMOL/L (ref 137–145)
WBC # BLD AUTO: 7.9 K/UL (ref 3.8–10.6)

## 2021-04-13 PROCEDURE — 93005 ELECTROCARDIOGRAM TRACING: CPT

## 2021-04-13 PROCEDURE — 71275 CT ANGIOGRAPHY CHEST: CPT

## 2021-04-13 PROCEDURE — 94760 N-INVAS EAR/PLS OXIMETRY 1: CPT

## 2021-04-13 PROCEDURE — 94640 AIRWAY INHALATION TREATMENT: CPT

## 2021-04-13 PROCEDURE — 83880 ASSAY OF NATRIURETIC PEPTIDE: CPT

## 2021-04-13 PROCEDURE — 85379 FIBRIN DEGRADATION QUANT: CPT

## 2021-04-13 PROCEDURE — 85610 PROTHROMBIN TIME: CPT

## 2021-04-13 PROCEDURE — 96376 TX/PRO/DX INJ SAME DRUG ADON: CPT

## 2021-04-13 PROCEDURE — 93306 TTE W/DOPPLER COMPLETE: CPT

## 2021-04-13 PROCEDURE — 85730 THROMBOPLASTIN TIME PARTIAL: CPT

## 2021-04-13 PROCEDURE — 96366 THER/PROPH/DIAG IV INF ADDON: CPT

## 2021-04-13 PROCEDURE — 80053 COMPREHEN METABOLIC PANEL: CPT

## 2021-04-13 PROCEDURE — 82550 ASSAY OF CK (CPK): CPT

## 2021-04-13 PROCEDURE — 85025 COMPLETE CBC W/AUTO DIFF WBC: CPT

## 2021-04-13 PROCEDURE — 93351 STRESS TTE COMPLETE: CPT

## 2021-04-13 PROCEDURE — 99285 EMERGENCY DEPT VISIT HI MDM: CPT

## 2021-04-13 PROCEDURE — 96375 TX/PRO/DX INJ NEW DRUG ADDON: CPT

## 2021-04-13 PROCEDURE — 93970 EXTREMITY STUDY: CPT

## 2021-04-13 PROCEDURE — 87635 SARS-COV-2 COVID-19 AMP PRB: CPT

## 2021-04-13 PROCEDURE — 80061 LIPID PANEL: CPT

## 2021-04-13 PROCEDURE — 83735 ASSAY OF MAGNESIUM: CPT

## 2021-04-13 PROCEDURE — 84484 ASSAY OF TROPONIN QUANT: CPT

## 2021-04-13 PROCEDURE — 36415 COLL VENOUS BLD VENIPUNCTURE: CPT

## 2021-04-13 PROCEDURE — 96365 THER/PROPH/DIAG IV INF INIT: CPT

## 2021-04-13 PROCEDURE — 71046 X-RAY EXAM CHEST 2 VIEWS: CPT

## 2021-04-13 NOTE — CT
EXAMINATION TYPE: CT angio chest

 

DATE OF EXAM: 4/13/2021

 

COMPARISON: None

 

HISTORY: chest pain

 

CT DLP: 465.6 mGycm

Automated exposure control for dose reduction was used.

 

CONTRAST: 

Performed with IV Contrast, patient injected with 85cc mL of Isovue 370.

 

Images obtained from the thoracic inlet to the diaphragm with IV contrast and 3-D post processed imag
es.

 

There is mild pulmonary emphysema. The lungs are clear of consolidation. There is some mild subsegmen
bud atelectasis at the lung bases. Heart size is normal. There is no pleural effusion. There is no pe
ricardial effusion.

 

There is no mediastinal adenopathy. There are no hilar masses. Thoracic aorta shows mild atheromatous
 change. 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 There is no dissection.

 

There is normal contrast opacification of the pulmonary arteries. There are no filling defects.

 

Thoracic vertebra have normal spacing and alignment. Posterior elements are intact. There is no compr
ession fracture. Sternum is intact.

 

IMPRESSION:

No evidence of pulmonary embolism. Normal heart.

 

Subsegmental atelectasis at the posterior lung bases. Mild pulmonary emphysema.

## 2021-04-13 NOTE — US
EXAMINATION TYPE: US venous doppler duplex LE 

 

DATE OF EXAM: 4/13/2021 8:57 PM

 

COMPARISON: NONE

 

CLINICAL HISTORY: Peripheral edema with elevated d-dimer. Peripheral edema x 5 weeks. Elevated D Dime
r. No hx of DVT. Patient not taking blood thinners.

 

SIDE PERFORMED: Bilateral  

 

TECHNIQUE:  The lower extremity deep venous system is examined utilizing real time linear array sonog
kiana with graded compression, doppler sonography and color-flow sonography.

 

VESSELS IMAGED:

Common Femoral Vein

Deep Femoral Vein

Greater Saphenous Vein *

Femoral Vein

Popliteal Vein

Small Saphenous Vein *

Proximal Calf Veins

(* superficial vessels)

 

*Limited due to edema.

 

Right Leg:  No evidence of DVT in veins imaged at this time from prox calf veins to CFV/GSV. Duplicat
e popliteal vein seen.

 

Left Leg:  No evidence of DVT in veins imaged at this time from prox calf veins to CFV/GSV.

 

 

 

IMPRESSION:  No sign of deep vein thrombosis in both legs.

## 2021-04-13 NOTE — ED
Chest Pain HPI





- General


Chief Complaint: Chest Pain


Stated Complaint: Chest Pain, sent from 


Time Seen by Provider: 04/13/21 17:41


Source: patient, family, RN notes reviewed


Mode of arrival: wheelchair


Limitations: no limitations





- History of Present Illness


Initial Comments: 





This is a 66-year-old male with a parent prior history of MIs but no history of 

stenting or cardiovascular procedures who states she's been having exertional 

dyspnea and left-sided chest pain intermittently over last 2 weeks.  This is new

for him he was actually in [place] when he started noticing his exertional 

dyspnea.  He was seen by his doctor's office today and sent here for further 

evaluation currently is not short of breath while at rest she has slight amount 

left-sided chest pain he points to his left lateral costal chondral junction.  

No cough phlegm production fevers chills sweats or other symptoms patient also 

states that he has been having some edema of the stroke back from [place] he 

does get edema after this time he has no Pain.


MD Complaint: chest pain, other





- Related Data


                                Home Medications











 Medication  Instructions  Recorded  Confirmed


 


Losartan Potassium 100 mg PO DAILY 09/30/14 04/13/21


 


Metoprolol Succinate 25 mg PO DAILY 09/30/14 04/13/21


 


Tamsulosin HCl 0.4 mg PO DAILY 05/01/15 04/13/21


 


Ubidecarenone [Co Q-10] 100 mg PO DAILY 05/01/15 04/13/21


 


Vitamin B Complex 1 cap PO DAILY 05/01/15 04/13/21


 


Glucosamine Sulfate 500 mg PO DAILY 09/13/18 04/13/21


 


Isosorbide Mononitrate [Isosorbide 30 mg PO DAILY 09/13/18 04/13/21





Mononitrate ER]   


 


Albuterol Nebulized [Ventolin 2.5 mg INHALATION RT-Q6H PRN 04/13/21 04/13/21





Nebulized]   


 


Atorvastatin Calcium [Lipitor] 20 mg PO DAILY 04/13/21 04/13/21


 


Furosemide [Lasix] 40 mg PO DAILY PRN 04/13/21 04/13/21


 


amLODIPine [Norvasc] 10 mg PO DAILY 04/13/21 04/13/21


 


metFORMIN HCL [Glucophage] 500 mg PO DAILY 04/13/21 04/13/21











                                    Allergies











Allergy/AdvReac Type Severity Reaction Status Date / Time


 


naproxen sodium [From Aleve] Allergy  Rash/Hives Verified 04/13/21 21:20














Review of Systems


ROS Statement: 


Those systems with pertinent positive or pertinent negative responses have been 

documented in the HPI.





ROS Other: All systems not noted in ROS Statement are negative.





EKG Findings





- EKG Results:


EKG: interpreted by JOHN, sinus rhythm (Sinus rhythm a 72 OH interval 162 QRS 

102 QT since /453 this is consistent with the 1 presented from the office

from today.)





Past Medical History


Past Medical History: Coronary Artery Disease (CAD), CVA/TIA, Hyperlipidemia, 

Hypertension, Myocardial Infarction (MI)


Additional Past Medical History / Comment(s): AUG 28, 14.


Last Myocardial Infarction Date:: UNKNOWN


History of Any Multi-Drug Resistant Organisms: None Reported


Past Surgical History: Tonsillectomy


Additional Past Surgical History / Comment(s): ventral hernia repair


Past Anesthesia/Blood Transfusion Reactions: No Reported Reaction


Past Psychological History: No Psychological Hx Reported


Past Alcohol Use History: None Reported


Past Drug Use History: None Reported





- Past Family History


  ** Mother


Family Medical History: Deep Vein Thrombosis (DVT)





General Exam





- General Exam Comments


Initial Comments: 





This is a well-developed well-nourished awake alert oriented 3 male


Limitations: no limitations


General appearance: alert, in no apparent distress


Head exam: Present: atraumatic, normocephalic, normal inspection


Eye exam: Present: normal appearance, PERRL, EOMI.  Absent: scleral icterus, 

conjunctival injection, periorbital swelling


ENT exam: Present: normal exam, mucous membranes moist


Neck exam: Present: normal inspection, full ROM, other (No stridor JVD or 

bruits).  Absent: tenderness, meningismus, lymphadenopathy


Respiratory exam: Present: normal lung sounds bilaterally, chest wall tenderness

(Reproducible left-sided chest pain this does reproduce the pain the patient has

been experiencing).  Absent: respiratory distress, wheezes, rales, rhonchi, 

stridor


Cardiovascular Exam: Present: regular rate, normal rhythm, normal heart sounds. 

Absent: systolic murmur, diastolic murmur, rubs, gallop, clicks


GI/Abdominal exam: Present: soft, normal bowel sounds.  Absent: distended, 

tenderness, guarding, rebound, rigid


Extremities exam: Present: normal inspection, full ROM, normal capillary refill,

pedal edema.  Absent: tenderness, joint swelling, calf tenderness


Back exam: Present: normal inspection


Neurological exam: Present: alert, oriented X3, CN II-XII intact


Psychiatric exam: Present: normal affect, normal mood


Skin exam: Present: warm, dry, intact, normal color.  Absent: rash





Course


                                   Vital Signs











  04/13/21 04/13/21 04/13/21





  17:22 17:59 20:57


 


Temperature 97.7 F  


 


Pulse Rate 78  56 L


 


Pulse Rate [  72 





Cardiac Monitor   





]   


 


Respiratory 20 16 18





Rate   


 


Blood Pressure 174/118  124/79


 


O2 Sat by Pulse 92 L  





Oximetry   














Chest Pain MDM





- MDM





Imaging reviewed x-ray negative CT ultrasound negative for evidence of clotting.

 I did discuss findings with the patient and his wife.  Patient be admitted for 

cardiology consultation the presentation is consistent with unstable angina.  

Dr. Kaur who is covering for Dr. Pastor





Disposition


Clinical Impression: 


 Chest pain, Unstable angina pectoris





Disposition: ADMITTED AS IP TO THIS HOSP


Condition: Fair


Referrals: 


Brain Ramon MD [Primary Care Provider] - 1-2 days

## 2021-04-13 NOTE — XR
EXAMINATION TYPE: XR chest 2V

 

DATE OF EXAM: 4/13/2021

 

COMPARISON: 9/25/2014

 

HISTORY: Chest pain

 

TECHNIQUE:

 

FINDINGS: There is no heart failure nor confluent pneumonic infiltrate. There is slight increased int
erstitial density at the lung bases. There are no hilar masses. There are chest leads. Bony thorax is
 intact.

 

IMPRESSION: Multiple fibrotic changes. Normal heart. No adverse change.

## 2021-04-14 LAB
CHOLEST SERPL-MCNC: 138 MG/DL (ref ?–200)
GLUCOSE BLD-MCNC: 106 MG/DL (ref 75–99)
GLUCOSE BLD-MCNC: 114 MG/DL (ref 75–99)
GLUCOSE BLD-MCNC: 187 MG/DL (ref 75–99)
GLUCOSE BLD-MCNC: 241 MG/DL (ref 75–99)
HDLC SERPL-MCNC: 46 MG/DL (ref 40–60)
LDLC SERPL CALC-MCNC: 80 MG/DL (ref 0–99)
TRIGL SERPL-MCNC: 62 MG/DL (ref ?–150)

## 2021-04-14 RX ADMIN — LOSARTAN POTASSIUM SCH MG: 50 TABLET, FILM COATED ORAL at 09:05

## 2021-04-14 RX ADMIN — IPRATROPIUM BROMIDE AND ALBUTEROL SULFATE SCH ML: .5; 3 SOLUTION RESPIRATORY (INHALATION) at 12:17

## 2021-04-14 RX ADMIN — ATORVASTATIN CALCIUM SCH MG: 20 TABLET, FILM COATED ORAL at 09:06

## 2021-04-14 RX ADMIN — BUDESONIDE SCH MG: 1 SUSPENSION RESPIRATORY (INHALATION) at 20:25

## 2021-04-14 RX ADMIN — Medication SCH EACH: at 09:39

## 2021-04-14 RX ADMIN — METHYLPREDNISOLONE SODIUM SUCCINATE SCH MG: 40 INJECTION, POWDER, FOR SOLUTION INTRAMUSCULAR; INTRAVENOUS at 13:19

## 2021-04-14 RX ADMIN — IPRATROPIUM BROMIDE AND ALBUTEROL SULFATE SCH ML: .5; 3 SOLUTION RESPIRATORY (INHALATION) at 20:25

## 2021-04-14 RX ADMIN — FORMOTEROL FUMARATE DIHYDRATE SCH MCG: 20 SOLUTION RESPIRATORY (INHALATION) at 20:25

## 2021-04-14 RX ADMIN — TAMSULOSIN HYDROCHLORIDE SCH MG: 0.4 CAPSULE ORAL at 09:05

## 2021-04-14 RX ADMIN — ISOSORBIDE MONONITRATE SCH MG: 30 TABLET, EXTENDED RELEASE ORAL at 09:06

## 2021-04-14 RX ADMIN — ASPIRIN 81 MG CHEWABLE TABLET SCH MG: 81 TABLET CHEWABLE at 09:05

## 2021-04-14 RX ADMIN — BUDESONIDE SCH: 1 SUSPENSION RESPIRATORY (INHALATION) at 12:15

## 2021-04-14 RX ADMIN — IPRATROPIUM BROMIDE AND ALBUTEROL SULFATE SCH ML: .5; 3 SOLUTION RESPIRATORY (INHALATION) at 15:44

## 2021-04-14 RX ADMIN — METHYLPREDNISOLONE SODIUM SUCCINATE SCH MG: 40 INJECTION, POWDER, FOR SOLUTION INTRAMUSCULAR; INTRAVENOUS at 21:05

## 2021-04-14 RX ADMIN — FORMOTEROL FUMARATE DIHYDRATE SCH: 20 SOLUTION RESPIRATORY (INHALATION) at 12:15

## 2021-04-14 RX ADMIN — METOPROLOL SUCCINATE SCH: 25 TABLET, EXTENDED RELEASE ORAL at 10:52

## 2021-04-14 NOTE — ECHOF
Referral Reason:Chest pain, lv function



MEASUREMENTS

--------

HEIGHT: 165.1 cm

WEIGHT: 92.5 kg

BP: 126/73

RVIDd:   3.8 cm     (< 3.3)

IVSd:   1.5 cm     (0.6 - 1.1)

LVIDd:   4.0 cm     (3.9 - 5.3)

LVPWd:   1.4 cm     (0.6 - 1.1)

IVSs:   2.1 cm

LVIDs:   2.7 cm

LVPWs:   1.8 cm

LA Diam:   3.5 cm     (2.7 - 3.8)

Ao Diam:   3.3 cm     (2.0 - 3.7)

AV Cusp:   2.0 cm     (1.5 - 2.6)

MV EXCURSION:   12.148 mm     (> 18.000)

MV EF SLOPE:   39 mm/s     (70 - 150)

EPSS:   0.5 cm

MV E Rashaad:   0.64 m/s

MV DecT:   291 ms

MV A Rashaad:   0.83 m/s

MV E/A Ratio:   0.78 

RAP:   5.00 mmHg

RVSP:   34.14 mmHg







FINDINGS

--------

Resting bradycardia (HR<60bpm).

This was a technically adequate study.

The left ventricular size is normal.   There is moderate concentric left ventricular hypertrophy.   O
verall left ventricular systolic function is low-normal with, an EF between 50 - 55 %.   Basal inferi
or LV wall motion is hypokinetic.

The right ventricle is mild to moderately enlarged.

The left atrium is normal in size.

The right atrium is normal in size.

5.0mg of Lumason was utilized for enhancement of images

Interatrial and interventricular septum intact.

There is mild aortic valve sclerosis.

Mild mitral annular calcification present.

Mild tricuspid regurgitation present.   There is borderline pulmonary artery hypertension.   The righ
t ventricular systolic pressure, as measured by Doppler, is 34.14mmHg.

There is no pulmonic regurgitation present.

The aortic root size is normal.

Normal inferior vena cava with normal inspiratory collapse consistent with estimated right atrial pre
ssure of  5 mmHg.

There is no pericardial effusion.



CONCLUSIONS

--------

1. Resting bradycardia (HR<60bpm).

2. The left ventricular size is normal.

3. There is moderate concentric left ventricular hypertrophy.

4. Overall left ventricular systolic function is low-normal with, an EF between 50 - 55 %.

5. Basal inferior LV wall motion is hypokinetic.

6. The right ventricle is mild to moderately enlarged.

7. 5.0mg of Lumason was utilized for enhancement of images

8. There is mild aortic valve sclerosis.

9. Mild mitral annular calcification present.

10. Mild tricuspid regurgitation present.

11. There is borderline pulmonary artery hypertension.

12. The right ventricular systolic pressure, as measured by Doppler, is 34.14mmHg.

13. There is no pericardial effusion.





SONOGRAPHER: Vaishnavi Martin RDCS

## 2021-04-14 NOTE — P.HPIM
History of Present Illness


H&P Date: 04/14/21


Chief Complaint: Chest pain/shortness of breath





History of presenting complaint:


This is a pleasant 66-year-old patient of Dr. Brian Ramon.  Chronic stable 

medical conditions include coronary artery disease with prior MI in 2014 with no

coronary intervention, hypertension, hyperlipidemia.  Patient is noticed some 

leg leg swelling for about a month.  Patient has noticed increasing short of 

breath.  Also noticed left infraclavicular chest pain.  Increase with activity 

better with rest.  Patient getting easily short winded.  On 2 episodes he had 

some perspiration.  No dizziness or lightheadedness.  Patient also has been 

wheezing.  No fever no chills.  Denies any orthopnea.





Review of systems:


GEN.:  Tired


EYES: None


HEENT: None


NECK: None


RESPIRATORY: As above


CARDIOVASCULAR: As above


GASTROINTESTINAL: None


GENITOURINARY: None


MUSCULOSKELETAL: None


LYMPHATICS: None


HEMATOLOGICAL: None  


PSYCHIATRY: None


NEUROLOGICAL: None





Past medical history to include:


Coronary artery disease with MI in 2014 with no intervention, stroke, 

hypertension, hyperlipidemia





Social history:


Patient smoked about 2 packs a day for 45 years stopped about 3 years ago.  

.  Retired from [employer].  No alcohol.





Physical examination:


VITAL SIGNS: 97.7, 78, 20, 1 24 x 79, 92% room air


GENERAL: BMI 33.9, sitting up, awake.


EYES: Pupils equal.  Conjunctiva normal.


HEENT: External appearance of nose and ears normal, oral cavity grossly normal.


NECK: JVD not raised; masses not palpable.


HEART: First and second heart sounds are normal;  no edema.  


LUNGS: Respiratory rate increased, diminished breath sounds prolonged 

expiration.  


ABDOMEN: Soft,  nontender, liver spleen not palpable, no masses palpable.  


PSYCH: Alert and oriented x3;  mood  and affect normal.  


NEUROLOGICAL: Cranial nerves grossly intact; no facial asymmetry,   power and 

sensation grossly intact. 


LYMPHATICS: No lymph nodes palpable in the axilla and neck





INVESTIGATIONS, reviewed in the clinical context:


WBC 7.9 hemoglobin 16.5 platelets 270 d-dimer 0.63 potassium 4.1 creatinine 0.84


Troponin I 3 negative LDL 80


Coronavirus [PCR] not detected


EKG tracing personally reviewed by me-poor R-wave progression.  Normal sinus 

rhythm


Doppler ultrasound of lower extremities: Negative in both the legs


Chest x-ray film personally reviewed by me-borderline cardiomegaly.  Prominent 

pulmonary artery


2-D echocardiogram: Moderate concentric LVH.  EF 50-55% basal inferior LV wall 

is hypokinetic.





Assessment and plan:





-Possible unstable angina in a patient with known coronary artery disease.  Trop

onin 3 negative.  Patient having a stress test.  Was placed on IV heparin in 

the ER.  Aspirin.  Stress test





-Coronary artery disease with prior MI 3 years ago


On beta blocker, Cozaar





-Acute COPD exacerbation in a previous smoker


Patient be started on nebulized bronchodilators, IV steroids, inhaled steroids, 

long-acting beta agonist





-Diabetes mellitus type 2 on oral hypoglycemic


Follow Accu-Cheks





BPH:


Continue with Flomax





Care was discussed with the patient.  Questions answered.  Stress test pending.





Past Medical History


Past Medical History: Coronary Artery Disease (CAD), CVA/TIA, Hyperlipidemia, 

Hypertension, Myocardial Infarction (MI)


Additional Past Medical History / Comment(s): AUG 28, 14.


Last Myocardial Infarction Date:: UNKNOWN


History of Any Multi-Drug Resistant Organisms: None Reported


Past Surgical History: Tonsillectomy


Additional Past Surgical History / Comment(s): ventral hernia repair


Past Anesthesia/Blood Transfusion Reactions: No Reported Reaction


Past Psychological History: No Psychological Hx Reported


Smoking Status: Former smoker


Past Alcohol Use History: None Reported


Past Drug Use History: None Reported





- Past Family History


  ** Mother


Family Medical History: Deep Vein Thrombosis (DVT)





Medications and Allergies


                                Home Medications











 Medication  Instructions  Recorded  Confirmed  Type


 


Losartan Potassium 100 mg PO DAILY 09/30/14 04/13/21 History


 


Metoprolol Succinate 25 mg PO DAILY 09/30/14 04/13/21 History


 


Tamsulosin HCl 0.4 mg PO DAILY 05/01/15 04/13/21 History


 


Ubidecarenone [Co Q-10] 100 mg PO DAILY 05/01/15 04/13/21 History


 


Vitamin B Complex 1 cap PO DAILY 05/01/15 04/13/21 History


 


Glucosamine Sulfate 500 mg PO DAILY 09/13/18 04/13/21 History


 


Isosorbide Mononitrate [Isosorbide 30 mg PO DAILY 09/13/18 04/13/21 History





Mononitrate ER]    


 


Albuterol Nebulized [Ventolin 2.5 mg INHALATION RT-Q6H PRN 04/13/21 04/13/21 

History





Nebulized]    


 


Atorvastatin Calcium [Lipitor] 20 mg PO DAILY 04/13/21 04/13/21 History


 


Furosemide [Lasix] 40 mg PO DAILY PRN 04/13/21 04/13/21 History


 


amLODIPine [Norvasc] 10 mg PO DAILY 04/13/21 04/13/21 History


 


metFORMIN HCL [Glucophage] 500 mg PO DAILY 04/13/21 04/13/21 History


 


Aspirin 81 mg PO DAILY #90 chewable 04/14/21  Rx


 


Nitroglycerin Sl Tabs [Nitrostat] 0.4 mg SUBLINGUAL Q5M PRN #25 tab 04/14/21  Rx








                                    Allergies











Allergy/AdvReac Type Severity Reaction Status Date / Time


 


naproxen sodium [From Aleve] Allergy  Rash/Hives Verified 04/13/21 21:20














Physical Exam


Vitals: 


                                   Vital Signs











  Temp Pulse Pulse Pulse Resp BP BP


 


 04/14/21 09:11       


 


 04/14/21 07:00  97.4 F L    63  18   124/65


 


 04/14/21 02:30  97.5 F L    55 L  15   126/73


 


 04/14/21 02:00      17  


 


 04/13/21 23:10  97.7 F    65  16   102/65


 


 04/13/21 22:00  97.3 F L  65    18  103/79 


 


 04/13/21 20:57   56 L    18  124/79 


 


 04/13/21 17:59    72   16  


 


 04/13/21 17:22  97.7 F  78    20  174/118 














  Pulse Ox


 


 04/14/21 09:11  97


 


 04/14/21 07:00  92 L


 


 04/14/21 02:30  92 L


 


 04/14/21 02:00 


 


 04/13/21 23:10  90 L


 


 04/13/21 22:00  95


 


 04/13/21 20:57 


 


 04/13/21 17:59 


 


 04/13/21 17:22  92 L








                                Intake and Output











 04/13/21 04/14/21 04/14/21





 22:59 06:59 14:59


 


Intake Total   200


 


Balance   200


 


Intake:   


 


  Oral   200


 


Other:   


 


  Voiding Method  Toilet Toilet


 


  # Voids 1 2 


 


  Weight 92.533 kg  














Results


CBC & Chem 7: 


                                 04/13/21 18:22





                                 04/13/21 18:22


Labs: 


                  Abnormal Lab Results - Last 24 Hours (Table)











  04/13/21 04/13/21 04/13/21 Range/Units





  18:22 18:22 23:09 


 


APTT     (22.0-30.0)  sec


 


D-Dimer  0.63 H    (<0.60)  mg/L FEU


 


Carbon Dioxide   31 H   (22-30)  mmol/L


 


POC Glucose (mg/dL)    140 H  (75-99)  mg/dL


 


Creatine Kinase   216 H   ()  U/L














  04/14/21 04/14/21 Range/Units





  05:36 07:22 


 


APTT  34.9 H   (22.0-30.0)  sec


 


D-Dimer    (<0.60)  mg/L FEU


 


Carbon Dioxide    (22-30)  mmol/L


 


POC Glucose (mg/dL)   106 H  (75-99)  mg/dL


 


Creatine Kinase    ()  U/L














Thrombosis Risk Factor Assmnt





- Choose All That Apply


Any of the Below Risk Factors Present?: Yes


Each Factor Represents 1 point: Obesity (BMI >25), Swollen legs (current)


Other Risk Factors: Yes


Each Risk Factor Represents 2 Points: Age 61-74 years


Other congenital or acquired thrombophilia - If yes, enter type in comment: No


Thrombosis Risk Factor Assessment Total Risk Factor Score: 4


Thrombosis Risk Factor Assessment Level: Moderate Risk

## 2021-04-14 NOTE — P.CRDCN
History of Present Illness


Consult date: 04/14/21


History of present illness: 





HISTORY OF PRESENT ILLNESS:  





This is a 66-year-old male with a past medical history significant for 

myocardial infarction, coronary artery disease, CVA/TIA, hypertension, 

hyperlipidemia, and former nicotine dependence. Patient follows in the office 

with Dr. Forrest. We have been asked to see the patient in consultation for chest

pain. Patient examined at the bedside.  Patient reports he has been having 

increased lower extremity edema for the past 4 weeks.  He also reports 

intermittent chest pressure for the last 4 weeks.  Patient states the chest 

pressure is in the left upper chest and occurs only with exertion.  He denied 

any shortness of breath or radiation of the pain.  Patient states he was in 

[place] for 2 weeks and thought his lower extremity edema was just related to 

the hot weather but it has not improved since being back in [place].  Patient 

states he went to see his primary care physician yesterday who completed an EKG 

and recommended that the patient come to the hospital for further evaluation.





EKG reveals sinus mechanism with no signs of acute ischemia.  Left axis 

deviation.  


Chest xray multiple fibrotic changes.  Normal heart.


Laboratory data: WBC 7.9.  Hemoglobin 16.5.  Platelet count 270.  D-dimer 0.63. 

Sodium 140.  Potassium 4.1.  BUN 15.  Creatinine 0.84.  Troponin negative 3.


Current home cardiac medications include Norvasc 10 mg daily, Lasix 40 mg daily,

Lipitor 20 mg daily, losartan 100 mg daily, Imdur 30 mg daily, and metoprolol 

succinate 25 mg daily


Chest CT: Negative for CTA


Cardiac catheterization history: 2014 revealing chronically occluded mid RCA.  

Moderate disease of left circumflex with mild to moderate disease of the LAD.  

Ejection fraction around 50%.





REVIEW OF SYSTEMS: 


At the time of my exam:


CONSTITUTIONAL: Denies fever or chills.


HEENT: Denies blurred vision, vision changes, or eye pain. Denies hemoptysis 


CARDIOVASCULAR: Denies chest pain. Denies orthopnea. Denies PND. Denies 

palpitations


RESPIRATORY: Denies shortness of breath. 


GASTROINTESTINAL: Denies abdominal pain. Denies nausea or vomiting. 


HEMATOLOGIC: Denies bleeding disorders.


GENITOURINARY:  Denies any blood in urine.


SKIN: Denies pruitis. Denies rash.





PHYSICAL EXAM: 


VITAL SIGNS: Reviewed.


GENERAL: Well-developed in no acute distress. 


HEENT: Head is normocephalic. Pupils are equal, round. Sclerae anicteric. Mucous

membranes of the mouth are moist. Neck supple. No JVD or thyromegaly


LUNGS: Respirations even and unlabored. Lungs essentially clear to auscultation 

bilaterally.


HEART: Regular rate and rhythm.  S1 and S2 heard.


ABDOMEN: Soft. Nondistended. Nontender.


EXTREMITIES: Normal range of motion.  No clubbing or cyanosis.  Peripheral 

pulses intact.  No lower extremity edema


NEUROLOGIC: Awake and alert. Oriented x 3. 





ASSESSMENT: 


Chest pain and increased lower extremity edema 4 weeks


Coronary artery disease


Hypertension


Hyperlipidemia


CVA/TIA


Former nicotine dependence





PLAN: 


An acute coronary event has been ruled out


Decrease aspirin to 81 mg daily


Discontinue IV heparin


Resume home cardiac medications


Patient to undergo Dobutamine stress test today


Further recommendations pending patient course





Nurse practitioner note has been reviewed by physician. Signing provider agrees 

with the documented findings, assessment, and plan of care. 








Past Medical History


Past Medical History: Coronary Artery Disease (CAD), CVA/TIA, Hyperlipidemia, 

Hypertension, Myocardial Infarction (MI)


Additional Past Medical History / Comment(s): AUG 28, 14.


Last Myocardial Infarction Date:: UNKNOWN


History of Any Multi-Drug Resistant Organisms: None Reported


Past Surgical History: Tonsillectomy


Additional Past Surgical History / Comment(s): ventral hernia repair


Past Anesthesia/Blood Transfusion Reactions: No Reported Reaction


Past Psychological History: No Psychological Hx Reported


Smoking Status: Former smoker


Past Alcohol Use History: None Reported


Past Drug Use History: None Reported





- Past Family History


  ** Mother


Family Medical History: Deep Vein Thrombosis (DVT)





Medications and Allergies


                                Home Medications











 Medication  Instructions  Recorded  Confirmed  Type


 


Losartan Potassium 100 mg PO DAILY 09/30/14 04/13/21 History


 


Metoprolol Succinate 25 mg PO DAILY 09/30/14 04/13/21 History


 


Tamsulosin HCl 0.4 mg PO DAILY 05/01/15 04/13/21 History


 


Ubidecarenone [Co Q-10] 100 mg PO DAILY 05/01/15 04/13/21 History


 


Vitamin B Complex 1 cap PO DAILY 05/01/15 04/13/21 History


 


Glucosamine Sulfate 500 mg PO DAILY 09/13/18 04/13/21 History


 


Isosorbide Mononitrate [Isosorbide 30 mg PO DAILY 09/13/18 04/13/21 History





Mononitrate ER]    


 


Albuterol Nebulized [Ventolin 2.5 mg INHALATION RT-Q6H PRN 04/13/21 04/13/21 

History





Nebulized]    


 


Atorvastatin Calcium [Lipitor] 20 mg PO DAILY 04/13/21 04/13/21 History


 


Furosemide [Lasix] 40 mg PO DAILY PRN 04/13/21 04/13/21 History


 


amLODIPine [Norvasc] 10 mg PO DAILY 04/13/21 04/13/21 History


 


metFORMIN HCL [Glucophage] 500 mg PO DAILY 04/13/21 04/13/21 History








                                    Allergies











Allergy/AdvReac Type Severity Reaction Status Date / Time


 


naproxen sodium [From Aleve] Allergy  Rash/Hives Verified 04/13/21 21:20














Physical Exam


Vitals: 


                                   Vital Signs











  Temp Pulse Pulse Pulse Resp BP BP


 


 04/14/21 09:11       


 


 04/14/21 07:00  97.4 F L    63  18   124/65


 


 04/14/21 02:30  97.5 F L    55 L  15   126/73


 


 04/14/21 02:00      17  


 


 04/13/21 23:10  97.7 F    65  16   102/65


 


 04/13/21 22:00  97.3 F L  65    18  103/79 


 


 04/13/21 20:57   56 L    18  124/79 


 


 04/13/21 17:59    72   16  


 


 04/13/21 17:22  97.7 F  78    20  174/118 














  Pulse Ox


 


 04/14/21 09:11  97


 


 04/14/21 07:00  92 L


 


 04/14/21 02:30  92 L


 


 04/14/21 02:00 


 


 04/13/21 23:10  90 L


 


 04/13/21 22:00  95


 


 04/13/21 20:57 


 


 04/13/21 17:59 


 


 04/13/21 17:22  92 L








                                Intake and Output











 04/13/21 04/14/21 04/14/21





 22:59 06:59 14:59


 


Intake Total   200


 


Balance   200


 


Intake:   


 


  Oral   200


 


Other:   


 


  Voiding Method  Toilet Toilet


 


  # Voids 1 2 


 


  Weight 92.533 kg  














Results





                                 04/13/21 18:22





                                 04/13/21 18:22


                                 Cardiac Enzymes











  04/13/21 04/13/21 04/13/21 Range/Units





  18:22 18:22 21:58 


 


AST  26    (17-59)  U/L


 


Troponin I   <0.012  <0.012  (0.000-0.034)  ng/mL














  04/14/21 Range/Units





  00:28 


 


AST   (17-59)  U/L


 


Troponin I  <0.012  (0.000-0.034)  ng/mL








                                   Coagulation











  04/13/21 04/14/21 Range/Units





  18:22 05:36 


 


PT  9.6   (9.0-12.0)  sec


 


APTT  23.9  34.9 H  (22.0-30.0)  sec








                                     Lipids











  04/14/21 Range/Units





  00:28 


 


Triglycerides  62  (<150)  mg/dL


 


Cholesterol  138  (<200)  mg/dL


 


HDL Cholesterol  46  (40-60)  mg/dL








                                       CBC











  04/13/21 Range/Units





  18:22 


 


WBC  7.9  (3.8-10.6)  k/uL


 


RBC  5.35  (4.30-5.90)  m/uL


 


Hgb  16.5  (13.0-17.5)  gm/dL


 


Hct  47.9  (39.0-53.0)  %


 


Plt Count  270  (150-450)  k/uL








                          Comprehensive Metabolic Panel











  04/13/21 Range/Units





  18:22 


 


Sodium  140  (137-145)  mmol/L


 


Potassium  4.1  (3.5-5.1)  mmol/L


 


Chloride  100  ()  mmol/L


 


Carbon Dioxide  31 H  (22-30)  mmol/L


 


BUN  15  (9-20)  mg/dL


 


Creatinine  0.84  (0.66-1.25)  mg/dL


 


Glucose  93  (74-99)  mg/dL


 


Calcium  9.8  (8.4-10.2)  mg/dL


 


AST  26  (17-59)  U/L


 


ALT  16  (4-49)  U/L


 


Alkaline Phosphatase  107  ()  U/L


 


Total Protein  7.4  (6.3-8.2)  g/dL


 


Albumin  4.6  (3.5-5.0)  g/dL








                               Current Medications











Generic Name Dose Route Start Last Admin





  Trade Name Freq  PRN Reason Stop Dose Admin


 


Albuterol Sulfate  2.5 mg  04/13/21 21:39 





  Albuterol Nebulized 2.5 Mg/3 Ml  INHALATION  





  RT-Q6H PRN  





  Shortness Of Breath  


 


Amlodipine Besylate  10 mg  04/14/21 09:00  04/14/21 09:06





  Amlodipine 10 Mg Tab  PO   10 mg





  DAILY TAMAR   Administration


 


Aspirin  81 mg  04/14/21 09:00  04/14/21 09:05





  Aspirin 81 Mg  PO   81 mg





  DAILY TAMAR   Administration


 


Atorvastatin Calcium  20 mg  04/14/21 09:00  04/14/21 09:06





  Atorvastatin 20 Mg Tab  PO   20 mg





  DAILY TAMAR   Administration


 


Furosemide  40 mg  04/14/21 09:00 





  Furosemide 40 Mg Tab  PO  





  DAILY PRN  





  Edema  


 


Isosorbide Mononitrate  30 mg  04/14/21 09:00  04/14/21 09:06





  Isosorbide Mononitrate Er 30 Mg Tab.Er.24h  PO   30 mg





  DAILY TAMAR   Administration


 


Losartan Potassium  100 mg  04/14/21 09:00  04/14/21 09:05





  Losartan 50 Mg Tab  PO   100 mg





  DAILY TAMAR   Administration


 


Metformin HCl  500 mg  04/14/21 09:00  04/14/21 09:06





  Metformin 500 Mg Tab  PO   500 mg





  DAILY TAMAR   Administration


 


Metoprolol Succinate  25 mg  04/14/21 09:00 





  Metoprolol Succinate (Er) 25 Mg Tab.Er.24h  PO  





  DAILY [facility]  


 


Multivit/Ca Carb/B Cmplx/FA/Prenat  1 each  04/14/21 09:00  04/14/21 09:39





  Folic Acid-Vit B Complex-Vit C 1 Cap  PO   1 each





  DAILY TAMAR   Administration


 


Nitroglycerin  0.4 mg  04/13/21 21:37 





  Nitroglycerin Sl Tabs 0.4 Mg Tab  SUBLINGUAL  





  Q5M PRN  





  Chest Pain  


 


Tamsulosin HCl  0.4 mg  04/14/21 09:00  04/14/21 09:05





  Tamsulosin 0.4 Mg Cap.Er.24h  PO   0.4 mg





  DAILY TAMAR   Administration








                                Intake and Output











 04/13/21 04/14/21 04/14/21





 22:59 06:59 14:59


 


Intake Total   200


 


Balance   200


 


Intake:   


 


  Oral   200


 


Other:   


 


  Voiding Method  Toilet Toilet


 


  # Voids 1 2 


 


  Weight 92.533 kg  








                                        





                                 04/13/21 18:22 





                                 04/13/21 18:22

## 2021-04-14 NOTE — ECHOS
STRESS ECHOCARDIOGRAM



LUMASON:

N/A Vial



INDICATIONS:

Chest pain.



MEDICATIONS:



BASELINE HEART RATE:

55



BASELINE BLOOD PRESSURE:

119/79



MAXIMUM HEART RATE:

132



MAXIMUM BLOOD PRESSURE:

119/79



85% MPHR:

132



100% MPHR:

155



METS:

N/A



MAXIMUM STAGE REACHED:

5



TOTAL EXERCISE TIME:



CLINICAL INFORMATION:

Baseline EKG shows sinus rhythm, poor R-wave progression. The patient was given

intravenous dobutamine over a period of 15 minutes as per protocol.  The patient also

received 0.5 mg of atropine, achieving 85% of predicted maximal heart rate without

chest pain or diagnostic ST-segment depression.  Occasional PVCs were noted during

dobutamine infusion.



Baseline echo shows normal left ventricular size and systolic function with an ejection

fraction of 60%.  Basal inferior wall is hypokinetic at rest.  Post dobutamine

infusion, there is normal hyperdynamic response of the anterior wall, lateral wall,

septum, proximal and mid inferior wall. Basal inferior wall remains unchanged.



CONCLUSIONS:

1. Negative stress test by EKG criteria.

2. Negative dobutamine echo.

3. Basal inferior wall motion abnormality suggestive of prior small inferior wall

    myocardial infarction.





MMODL / IJN: [id number] / Job#: [id number]

## 2021-04-15 VITALS — RESPIRATION RATE: 18 BRPM

## 2021-04-15 VITALS — SYSTOLIC BLOOD PRESSURE: 119 MMHG | DIASTOLIC BLOOD PRESSURE: 80 MMHG | TEMPERATURE: 97.6 F

## 2021-04-15 VITALS — HEART RATE: 70 BPM

## 2021-04-15 LAB — GLUCOSE BLD-MCNC: 177 MG/DL (ref 75–99)

## 2021-04-15 RX ADMIN — IPRATROPIUM BROMIDE AND ALBUTEROL SULFATE SCH ML: .5; 3 SOLUTION RESPIRATORY (INHALATION) at 07:04

## 2021-04-15 RX ADMIN — METOPROLOL SUCCINATE SCH MG: 25 TABLET, EXTENDED RELEASE ORAL at 09:02

## 2021-04-15 RX ADMIN — LOSARTAN POTASSIUM SCH MG: 50 TABLET, FILM COATED ORAL at 09:02

## 2021-04-15 RX ADMIN — TAMSULOSIN HYDROCHLORIDE SCH MG: 0.4 CAPSULE ORAL at 09:02

## 2021-04-15 RX ADMIN — IPRATROPIUM BROMIDE AND ALBUTEROL SULFATE SCH ML: .5; 3 SOLUTION RESPIRATORY (INHALATION) at 10:45

## 2021-04-15 RX ADMIN — FORMOTEROL FUMARATE DIHYDRATE SCH MCG: 20 SOLUTION RESPIRATORY (INHALATION) at 07:04

## 2021-04-15 RX ADMIN — ISOSORBIDE MONONITRATE SCH MG: 30 TABLET, EXTENDED RELEASE ORAL at 09:03

## 2021-04-15 RX ADMIN — Medication SCH EACH: at 09:03

## 2021-04-15 RX ADMIN — METHYLPREDNISOLONE SODIUM SUCCINATE SCH MG: 40 INJECTION, POWDER, FOR SOLUTION INTRAMUSCULAR; INTRAVENOUS at 03:49

## 2021-04-15 RX ADMIN — BUDESONIDE SCH MG: 1 SUSPENSION RESPIRATORY (INHALATION) at 07:04

## 2021-04-15 RX ADMIN — ASPIRIN 81 MG CHEWABLE TABLET SCH MG: 81 TABLET CHEWABLE at 09:02

## 2021-04-15 RX ADMIN — ATORVASTATIN CALCIUM SCH MG: 20 TABLET, FILM COATED ORAL at 09:02

## 2021-04-15 NOTE — P.PN
Subjective


Progress Note Date: 04/15/21





HISTORY OF PRESENT ILLNESS:  





This is a 66-year-old male with a past medical history significant for 

myocardial infarction, coronary artery disease, CVA/TIA, hypertension, 

hyperlipidemia, and former nicotine dependence. Patient follows in the office 

with Dr. Forrest. We have been asked to see the patient in consultation for chest

pain. Patient examined at the bedside.  Patient reports he has been having 

increased lower extremity edema for the past 4 weeks.  He also reports intermi

ttent chest pressure for the last 4 weeks.  Patient states the chest pressure is

in the left upper chest and occurs only with exertion.  He denied any shortness 

of breath or radiation of the pain.  Patient states he was in [place] for 2 

weeks and thought his lower extremity edema was just related to the hot weather 

but it has not improved since being back in [place].  Patient states he went to

see his primary care physician yesterday who completed an EKG and recommended 

that the patient come to the hospital for further evaluation.





EKG reveals sinus mechanism with no signs of acute ischemia.  Left axis 

deviation.  


Chest xray multiple fibrotic changes.  Normal heart.


Laboratory data: WBC 7.9.  Hemoglobin 16.5.  Platelet count 270.  D-dimer 0.63. 

Sodium 140.  Potassium 4.1.  BUN 15.  Creatinine 0.84.  Troponin negative 3.


Current home cardiac medications include Norvasc 10 mg daily, Lasix 40 mg daily,

Lipitor 20 mg daily, losartan 100 mg daily, Imdur 30 mg daily, and metoprolol 

succinate 25 mg daily


Chest CT: Negative for CTA


Cardiac catheterization history: 2014 revealing chronically occluded mid RCA.  

Moderate disease of left circumflex with mild to moderate disease of the LAD.  

Ejection fraction around 50%.





4/15/2021


Patient examined this morning at the bedside.  Patient denies chest pain or 

pressure.  He denies shortness of breath.  Patient underwent a dobutamine stress

test yesterday which was negative for stress-induced ischemia.  Echocardiogram 

completed revealed ejection fraction 50-55%, basal inferior LV wall hypokinesis,

mild tricuspid regurgitation, and borderline pulmonary artery hypertension.





PHYSICAL EXAM: 


VITAL SIGNS: Reviewed.


GENERAL: Well-developed in no acute distress. 


HEENT: Head is normocephalic. Pupils are equal, round. Sclerae anicteric. Mucous

membranes of the mouth are moist. Neck supple. No JVD or thyromegaly


LUNGS: Respirations even and unlabored. Lungs essentially clear to auscultation 

bilaterally.


HEART: Regular rate and rhythm.  S1 and S2 heard.


ABDOMEN: Soft. Nondistended. Nontender.


EXTREMITIES: Normal range of motion.  No clubbing or cyanosis.  Peripheral 

pulses intact.  No lower extremity edema


NEUROLOGIC: Awake and alert. Oriented x 3. 





ASSESSMENT: 


Chest pain and increased lower extremity edema 4 weeks


Coronary artery disease


Hypertension


Hyperlipidemia


CVA/TIA


Former nicotine dependence





PLAN: 


Patient is currently stable from a cardiac standpoint


Patient to follow up outpatient with Dr. Forrest





Nurse practitioner note has been reviewed by physician. Signing provider agrees 

with the documented findings, assessment, and plan of care. 








Objective





- Vital Signs


Vital signs: 


                                   Vital Signs











Temp  97.6 F   04/15/21 07:52


 


Pulse  70   04/15/21 10:57


 


Resp  18   04/15/21 10:57


 


BP  119/80   04/15/21 07:52


 


Pulse Ox  93 L  04/15/21 07:52








                                 Intake & Output











 04/14/21 04/15/21 04/15/21





 18:59 06:59 18:59


 


Intake Total 800  


 


Balance 800  


 


Weight 92.53 kg  


 


Intake:   


 


  Oral 800  


 


Other:   


 


  Voiding Method Toilet Toilet Toilet


 


  # Voids  2 














- Labs


CBC & Chem 7: 


                                 04/13/21 18:22





                                 04/13/21 18:22


Labs: 


                  Abnormal Lab Results - Last 24 Hours (Table)











  04/14/21 04/14/21 04/15/21 Range/Units





  17:19 20:51 07:13 


 


POC Glucose (mg/dL)  187 H  241 H  177 H  (75-99)  mg/dL

## 2021-04-17 NOTE — P.DS
Providers


Date of admission: 


04/13/21 21:40





Expected date of discharge: 04/15/21


Attending physician: 


Dima Pastor





Consults: 





                                        





04/13/21 21:37


Consult Physician Urgent 


   Consulting Provider: Jayson Mariee


   Consult Reason/Comments: Chest pain


   Do you want consulting provider notified?: Yes, Notify in am











Primary care physician: 


Brian Ramon





[facility] Course: 





Chief Complaint: Chest pain/shortness of breath





History of presenting complaint:


This is a pleasant 66-year-old patient of Dr. Brian Ramon.  Chronic stable 

medical conditions include coronary artery disease with prior MI in 2014 with no

coronary intervention, hypertension, hyperlipidemia.  Patient is noticed some 

leg leg swelling for about a month.  Patient has noticed increasing short of 

breath.  Also noticed left infraclavicular chest pain.  Increase with activity 

better with rest.  Patient getting easily short winded.  On 2 episodes he had 

some perspiration.  No dizziness or lightheadedness.  Patient also has been 

wheezing.  No fever no chills.  Denies any orthopnea.


Admitted with some anterior chest pain.  And COPD exacerbation.  Troponin is 

negative.  Dobutamine stress echocardiogram was negative.  Treated with a burst 

of steroids bronchodilators.


Today: Respiratory symptoms are greatly improved.  Care was discussed with the 

patient.  Patient to follow-up with cardiology is an outpatient.





Consultation:


Dr. BERT Plasencia from cardiology








Past medical history to include:


Coronary artery disease with MI in 2014 with no intervention, stroke, 

hypertension, hyperlipidemia





Social history:


Patient smoked about 2 packs a day for 45 years stopped about 3 years ago.  

.  Retired from [employer].  No alcohol.





Physical examination:


VITAL SIGNS: 97.6, 79, 18, 119/80, 93% on room air


GENERAL: Sitting up, comfortable


EYES: Pupils equal.  Conjunctiva normal.


HEENT: External appearance of nose and ears normal, oral cavity grossly normal.


NECK: JVD not raised; masses not palpable.


HEART: First and second heart sounds are normal;  no edema.  


LUNGS: Respiratory rate normal, improved air entry


ABDOMEN: Soft,  nontender, liver spleen not palpable, no masses palpable.  


PSYCH: Alert and oriented x3;  mood  and affect normal.  








INVESTIGATIONS, reviewed in the clinical context:


WBC 7.9 hemoglobin 16.5 platelets 270 d-dimer 0.63 potassium 4.1 creatinine 0.84


Troponin I 3 negative LDL 80


Coronavirus [PCR] not detected


EKG tracing personally reviewed by me-poor R-wave progression.  Normal sinus 

rhythm


Doppler ultrasound of lower extremities: Negative in both the legs


Chest x-ray film personally reviewed by me-borderline cardiomegaly.  Prominent 

pulmonary artery


2-D echocardiogram: Moderate concentric LVH.  EF 50-55% basal inferior LV wall 

is hypokinetic.


Dobutamine stress echocardiogram negative for ischemia





Assessment and plan:





-Possible unstable angina in a patient with known coronary artery disease.  

Troponin 3 negative.  Negative dobutamine stress echocardiogram





-Coronary artery disease with prior MI 3 years ago


On beta blocker, Cozaar





-Acute COPD exacerbation in a previous smoker


Patient be started on nebulized bronchodilators, IV steroids, inhaled steroids, 

long-acting beta agonist-improved





-Diabetes mellitus type 2 on oral hypoglycemic


Follow Accu-Cheks





BPH:


Continue with Flomax





Disposition:


Home


Patient Condition at Discharge: Fair





Plan - Discharge Summary


Discharge Rx Participant: No


New Discharge Prescriptions: 


New


   Aspirin 81 mg PO DAILY #90 chewable


   Nitroglycerin Sl Tabs [Nitrostat] 0.4 mg SUBLINGUAL Q5M PRN #25 tab


     PRN Reason: Chest Pain


   Ipratropium-Albuterol Nebulize [Duoneb 0.5 mg-3 mg/3 ml Soln] 3 ml INHALATION

TID #90 ml


   predniSONE 10 mg PO DAILY #30 tab


   Budesonide/Formoterol Fumarate [Symbicort 80-4.5 Mcg Inhaler] 1 puff 

INHALATION BID #1 inhaler





Continue


   Metoprolol Succinate 25 mg PO DAILY


   Losartan Potassium 100 mg PO DAILY


   Tamsulosin HCl 0.4 mg PO DAILY


   Vitamin B Complex 1 cap PO DAILY


   Ubidecarenone [Co Q-10] 100 mg PO DAILY


   Isosorbide Mononitrate [Isosorbide Mononitrate ER] 30 mg PO DAILY


   Glucosamine Sulfate 500 mg PO DAILY


   Furosemide [Lasix] 40 mg PO DAILY PRN


     PRN Reason: Edema


   metFORMIN HCL [Glucophage] 500 mg PO DAILY


   amLODIPine [Norvasc] 10 mg PO DAILY


   Atorvastatin Calcium [Lipitor] 20 mg PO DAILY


   Albuterol Nebulized [Ventolin Nebulized] 2.5 mg INHALATION RT-Q6H PRN


     PRN Reason: Shortness Of Breath


Discharge Medication List





Losartan Potassium 100 mg PO DAILY 09/30/14 [History]


Metoprolol Succinate 25 mg PO DAILY 09/30/14 [History]


Tamsulosin HCl 0.4 mg PO DAILY 05/01/15 [History]


Ubidecarenone [Co Q-10] 100 mg PO DAILY 05/01/15 [History]


Vitamin B Complex 1 cap PO DAILY 05/01/15 [History]


Glucosamine Sulfate 500 mg PO DAILY 09/13/18 [History]


Isosorbide Mononitrate [Isosorbide Mononitrate ER] 30 mg PO DAILY 09/13/18 

[History]


Albuterol Nebulized [Ventolin Nebulized] 2.5 mg INHALATION RT-Q6H PRN 04/13/21 

[History]


Atorvastatin Calcium [Lipitor] 20 mg PO DAILY 04/13/21 [History]


Furosemide [Lasix] 40 mg PO DAILY PRN 04/13/21 [History]


amLODIPine [Norvasc] 10 mg PO DAILY 04/13/21 [History]


metFORMIN HCL [Glucophage] 500 mg PO DAILY 04/13/21 [History]


Aspirin 81 mg PO DAILY #90 chewable 04/14/21 [Rx]


Nitroglycerin Sl Tabs [Nitrostat] 0.4 mg SUBLINGUAL Q5M PRN #25 tab 04/14/21 

[Rx]


Budesonide/Formoterol Fumarate [Symbicort 80-4.5 Mcg Inhaler] 1 puff INHALATION 

BID #1 inhaler 04/15/21 [Rx]


Ipratropium-Albuterol Nebulize [Duoneb 0.5 mg-3 mg/3 ml Soln] 3 ml INHALATION 

TID #90 ml 04/15/21 [Rx]


predniSONE 10 mg PO DAILY #30 tab 04/15/21 [Rx]








Follow up Appointment(s)/Referral(s): 


Ramakrishna Forrest MD [STAFF PHYSICIAN] - 2 Weeks


Brian Ramon MD [Primary Care Provider] - 1-2 days


Patient Instructions/Handouts:  Chest Pain (DC), COPD (Chronic Obstructive 

Pulmonary Disease) (DC)


Discharge Disposition: HOME SELF-CARE

## 2021-08-31 ENCOUNTER — HOSPITAL ENCOUNTER (OUTPATIENT)
Dept: HOSPITAL 47 - RADUSWWP | Age: 66
Discharge: HOME | End: 2021-08-31
Attending: FAMILY MEDICINE
Payer: MEDICARE

## 2021-08-31 ENCOUNTER — HOSPITAL ENCOUNTER (OUTPATIENT)
Dept: HOSPITAL 47 - RADXRMAIN | Age: 66
Discharge: HOME | End: 2021-08-31
Attending: NURSE PRACTITIONER
Payer: MEDICARE

## 2021-08-31 DIAGNOSIS — R79.1: Primary | ICD-10-CM

## 2021-08-31 DIAGNOSIS — M25.562: Primary | ICD-10-CM

## 2021-08-31 NOTE — US
EXAMINATION TYPE: US venous doppler duplex LE LT

 

DATE OF EXAM: 8/31/2021 11:54 AM

 

COMPARISON: US

 

CLINICAL HISTORY: R79.1 ddimer above range. D Dimer above range. No hx of DVT. Patient is on blood th
inner, unsure what kind.

 

SIDE PERFORMED: Left  

 

TECHNIQUE:  The lower extremity deep venous system is examined utilizing real time linear array sonog
kiana with graded compression, doppler sonography and color-flow sonography.

 

VESSELS IMAGED:

Common Femoral Vein

Deep Femoral Vein

Greater Saphenous Vein *

Femoral Vein

Popliteal Vein

Small Saphenous Vein *

Proximal Calf Veins

(* superficial vessels)

 

Left Leg:  No evidence of DVT in veins imaged at this time.

 

 

 

IMPRESSION:  

1. Left lower extremity ultrasound negative for deep venous thrombosis.

## 2021-08-31 NOTE — XR
EXAMINATION TYPE: XR knee limited LT

 

DATE OF EXAM: 8/31/2021

 

COMPARISON: None

 

HISTORY: Pain in left knee

 

TECHNIQUE: Left knee is examined in 2 views

 

FINDINGS: Minimal narrowing of the medial lateral compartments is present. Minimal joint effusion may
 be present. No acute fractures or dislocations are evident.

 

IMPRESSION:

1.  Suggestion of a minimal joint effusion with mild joint space narrowing.

## 2021-10-11 ENCOUNTER — HOSPITAL ENCOUNTER (OUTPATIENT)
Dept: HOSPITAL 47 - LABWHC1 | Age: 66
Discharge: HOME | End: 2021-10-11
Attending: INTERNAL MEDICINE
Payer: MEDICARE

## 2021-10-11 DIAGNOSIS — E78.2: Primary | ICD-10-CM

## 2021-10-11 LAB
ALBUMIN SERPL-MCNC: 4.3 G/DL (ref 3.8–4.9)
ALBUMIN/GLOB SERPL: 2.03 G/DL (ref 1.6–3.17)
ALP SERPL-CCNC: 103 U/L (ref 41–126)
ALT SERPL-CCNC: 16 U/L (ref 10–49)
ANION GAP SERPL CALC-SCNC: 11.2 MMOL/L (ref 4–12)
AST SERPL-CCNC: 17 U/L (ref 14–35)
BUN SERPL-SCNC: 15.7 MG/DL (ref 9–27)
BUN/CREAT SERPL: 15.39 RATIO (ref 12–20)
CALCIUM SPEC-MCNC: 9.5 MG/DL (ref 8.7–10.3)
CHLORIDE SERPL-SCNC: 103 MMOL/L (ref 96–109)
CHOLEST SERPL-MCNC: 147 MG/DL (ref 0–200)
CO2 SERPL-SCNC: 26.3 MMOL/L (ref 21.6–31.8)
GLOBULIN SER CALC-MCNC: 2.1 G/DL (ref 1.6–3.3)
GLUCOSE SERPL-MCNC: 110 MG/DL (ref 70–110)
HDLC SERPL-MCNC: 44.2 MG/DL (ref 40–60)
LDLC SERPL CALC-MCNC: 86 MG/DL (ref 0–131)
POTASSIUM SERPL-SCNC: 5 MMOL/L (ref 3.5–5.5)
PROT SERPL-MCNC: 6.5 G/DL (ref 6.2–8.2)
SODIUM SERPL-SCNC: 141 MMOL/L (ref 135–145)
TRIGL SERPL-MCNC: 83.9 MG/DL (ref 0–149)
VLDLC SERPL CALC-MCNC: 16.78 MG/DL (ref 5–40)

## 2021-10-11 PROCEDURE — 36415 COLL VENOUS BLD VENIPUNCTURE: CPT

## 2021-10-11 PROCEDURE — 80061 LIPID PANEL: CPT

## 2021-10-11 PROCEDURE — 80053 COMPREHEN METABOLIC PANEL: CPT

## 2022-03-15 ENCOUNTER — HOSPITAL ENCOUNTER (OUTPATIENT)
Dept: HOSPITAL 47 - LABWHC1 | Age: 67
Discharge: HOME | End: 2022-03-15
Attending: INTERNAL MEDICINE
Payer: MEDICARE

## 2022-03-15 DIAGNOSIS — E78.2: Primary | ICD-10-CM

## 2022-03-15 LAB
ALT SERPL-CCNC: 13 U/L (ref 10–49)
AST SERPL-CCNC: 18 U/L (ref 14–35)
CHOLEST SERPL-MCNC: 156 MG/DL (ref 0–200)
HDLC SERPL-MCNC: 40.3 MG/DL (ref 40–60)
LDLC SERPL CALC-MCNC: 93.3 MG/DL (ref 0–131)
TRIGL SERPL-MCNC: 112 MG/DL (ref 0–149)
VLDLC SERPL CALC-MCNC: 22.4 MG/DL (ref 5–40)

## 2022-03-15 PROCEDURE — 80061 LIPID PANEL: CPT

## 2022-03-15 PROCEDURE — 84460 ALANINE AMINO (ALT) (SGPT): CPT

## 2022-03-15 PROCEDURE — 36415 COLL VENOUS BLD VENIPUNCTURE: CPT

## 2022-03-15 PROCEDURE — 84450 TRANSFERASE (AST) (SGOT): CPT

## 2023-04-03 ENCOUNTER — HOSPITAL ENCOUNTER (OUTPATIENT)
Dept: HOSPITAL 47 - LABWHC1 | Age: 68
Discharge: HOME | End: 2023-04-03
Attending: INTERNAL MEDICINE
Payer: MEDICARE

## 2023-04-03 DIAGNOSIS — I10: Primary | ICD-10-CM

## 2023-04-03 DIAGNOSIS — E78.2: ICD-10-CM

## 2023-04-03 LAB
ALBUMIN SERPL-MCNC: 4 G/DL (ref 3.8–4.9)
ALBUMIN/GLOB SERPL: 1.74 G/DL (ref 1.6–3.17)
ALP SERPL-CCNC: 100 U/L (ref 41–126)
ALT SERPL-CCNC: 14 U/L (ref 10–49)
ANION GAP SERPL CALC-SCNC: 7.2 MMOL/L (ref 10–18)
AST SERPL-CCNC: 15 U/L (ref 14–35)
BUN SERPL-SCNC: 18.2 MG/DL (ref 9–27)
BUN/CREAT SERPL: 16.55 RATIO (ref 12–20)
CALCIUM SPEC-MCNC: 9.5 MG/DL (ref 8.7–10.3)
CHLORIDE SERPL-SCNC: 103 MMOL/L (ref 96–109)
CHOLEST SERPL-MCNC: 138 MG/DL (ref 0–200)
CO2 SERPL-SCNC: 29.8 MMOL/L (ref 20–27.5)
GLOBULIN SER CALC-MCNC: 2.3 G/DL (ref 1.6–3.3)
GLUCOSE SERPL-MCNC: 114 MG/DL (ref 70–110)
HDLC SERPL-MCNC: 39.2 MG/DL (ref 40–60)
LDLC SERPL CALC-MCNC: 81.5 MG/DL (ref 0–131)
POTASSIUM SERPL-SCNC: 4.8 MMOL/L (ref 3.5–5.5)
PROT SERPL-MCNC: 6.3 G/DL (ref 6.2–8.2)
SODIUM SERPL-SCNC: 140 MMOL/L (ref 135–145)
TRIGL SERPL-MCNC: 86.7 MG/DL (ref 0–149)
VLDLC SERPL CALC-MCNC: 17.34 MG/DL (ref 5–40)

## 2023-04-03 PROCEDURE — 80061 LIPID PANEL: CPT

## 2023-04-03 PROCEDURE — 36415 COLL VENOUS BLD VENIPUNCTURE: CPT

## 2023-04-03 PROCEDURE — 80053 COMPREHEN METABOLIC PANEL: CPT

## 2023-09-05 ENCOUNTER — HOSPITAL ENCOUNTER (OUTPATIENT)
Dept: HOSPITAL 47 - RADECHMAIN | Age: 68
Discharge: HOME | End: 2023-09-05
Attending: FAMILY MEDICINE
Payer: MEDICARE

## 2023-09-05 DIAGNOSIS — I48.91: Primary | ICD-10-CM

## 2023-09-05 PROCEDURE — 93270 REMOTE 30 DAY ECG REV/REPORT: CPT

## 2023-09-06 ENCOUNTER — HOSPITAL ENCOUNTER (OUTPATIENT)
Dept: HOSPITAL 47 - LABWHC1 | Age: 68
Discharge: HOME | End: 2023-09-06
Attending: INTERNAL MEDICINE
Payer: MEDICARE

## 2023-09-06 DIAGNOSIS — E78.2: Primary | ICD-10-CM

## 2023-09-06 LAB
ALT SERPL-CCNC: 16 U/L (ref 10–49)
AST SERPL-CCNC: 20 U/L (ref 14–35)
CHOLEST SERPL-MCNC: 132 MG/DL (ref 0–200)
HDLC SERPL-MCNC: 44.3 MG/DL (ref 40–60)
LDLC SERPL CALC-MCNC: 74.7 MG/DL (ref 0–131)
TRIGL SERPL-MCNC: 65 MG/DL (ref 0–149)
VLDLC SERPL CALC-MCNC: 13 MG/DL (ref 5–40)

## 2023-09-06 PROCEDURE — 84450 TRANSFERASE (AST) (SGOT): CPT

## 2023-09-06 PROCEDURE — 84460 ALANINE AMINO (ALT) (SGPT): CPT

## 2023-09-06 PROCEDURE — 36415 COLL VENOUS BLD VENIPUNCTURE: CPT

## 2023-09-06 PROCEDURE — 80061 LIPID PANEL: CPT

## 2023-09-14 NOTE — EST
EXERCISE STRESS



The patient was monitored between the 5th and 12th September 2023.  The rhythm strip

revealed atrial fibrillation.  The patient had converted back to sinus mechanism with a

conversion pause of 2.8 seconds on the 5th of September.  Single PVCs were noted.

Quality of the tracings was suboptimal.  No symptoms were reported.





CHAR / JANA: [id number] / Job#: [id number]

## 2023-12-05 ENCOUNTER — HOSPITAL ENCOUNTER (OUTPATIENT)
Dept: HOSPITAL 47 - LABWHC1 | Age: 68
Discharge: HOME | End: 2023-12-05
Attending: INTERNAL MEDICINE
Payer: MEDICARE

## 2023-12-05 DIAGNOSIS — I25.5: Primary | ICD-10-CM

## 2023-12-05 LAB
ALBUMIN SERPL-MCNC: 4.1 G/DL (ref 3.5–5)
ALBUMIN/GLOB SERPL: 1.5 {RATIO}
ALP SERPL-CCNC: 103 U/L (ref 38–126)
ALT SERPL-CCNC: 18 U/L (ref 4–49)
ANION GAP SERPL CALC-SCNC: 9 MMOL/L
AST SERPL-CCNC: 22 U/L (ref 17–59)
BUN SERPL-SCNC: 20 MG/DL (ref 9–20)
CALCIUM SPEC-MCNC: 9.6 MG/DL (ref 8.4–10.2)
CHLORIDE SERPL-SCNC: 97 MMOL/L (ref 98–107)
CO2 SERPL-SCNC: 29 MMOL/L (ref 22–30)
GLOBULIN SER CALC-MCNC: 2.7 G/DL
GLUCOSE SERPL-MCNC: 99 MG/DL (ref 74–99)
NT-PROBNP SERPL-MCNC: 83 PG/ML
POTASSIUM SERPL-SCNC: 4.7 MMOL/L (ref 3.5–5.1)
PROT SERPL-MCNC: 6.8 G/DL (ref 6.3–8.2)
SODIUM SERPL-SCNC: 135 MMOL/L (ref 137–145)

## 2023-12-05 PROCEDURE — 80053 COMPREHEN METABOLIC PANEL: CPT

## 2023-12-05 PROCEDURE — 36415 COLL VENOUS BLD VENIPUNCTURE: CPT

## 2023-12-05 PROCEDURE — 83880 ASSAY OF NATRIURETIC PEPTIDE: CPT

## 2024-03-27 ENCOUNTER — HOSPITAL ENCOUNTER (OUTPATIENT)
Dept: HOSPITAL 47 - LABWHC1 | Age: 69
Discharge: HOME | End: 2024-03-27
Attending: INTERNAL MEDICINE
Payer: MEDICARE

## 2024-03-27 DIAGNOSIS — E78.2: Primary | ICD-10-CM

## 2024-03-27 LAB
ALT SERPL-CCNC: 15 U/L (ref 10–49)
AST SERPL-CCNC: 14 U/L (ref 14–35)
CHOLEST SERPL-MCNC: 137 MG/DL (ref 0–200)
HDLC SERPL-MCNC: 50.3 MG/DL (ref 40–60)
LDLC SERPL CALC-MCNC: 75.2 MG/DL (ref 0–131)
TRIGL SERPL-MCNC: 57.4 MG/DL (ref 0–149)
VLDLC SERPL CALC-MCNC: 11.48 MG/DL (ref 5–40)

## 2024-03-27 PROCEDURE — 36415 COLL VENOUS BLD VENIPUNCTURE: CPT

## 2024-03-27 PROCEDURE — 84460 ALANINE AMINO (ALT) (SGPT): CPT

## 2024-03-27 PROCEDURE — 84450 TRANSFERASE (AST) (SGOT): CPT

## 2024-03-27 PROCEDURE — 80061 LIPID PANEL: CPT

## 2024-09-06 ENCOUNTER — HOSPITAL ENCOUNTER (OUTPATIENT)
Dept: HOSPITAL 47 - RADCTMAIN | Age: 69
Discharge: HOME | End: 2024-09-06
Attending: FAMILY MEDICINE
Payer: MEDICARE

## 2024-09-06 DIAGNOSIS — K57.30: Primary | ICD-10-CM

## 2024-09-06 PROCEDURE — 74176 CT ABD & PELVIS W/O CONTRAST: CPT

## 2024-09-06 NOTE — CT
EXAMINATION TYPE: CT abdomen pelvis wo con

CT DLP: 497.6 mGycm, Automated exposure control for dose reduction was used.

 

DATE OF EXAM: 9/6/2024 5:09 PM

 

COMPARISON: None 

 

eCLINICAL INDICATION: Male, 69 years old with history of R10.31 RIGHT LOWER QUADRANT PAIN; Abdominal 
pain in RLQ x1 week.

 

TECHNIQUE:  Axial CT abdomen pelvis wo con;Sagittal and coronal reformats were created on a separate 
workstation. 

 

Contrast used: mL of , (none if empty)

Oral contrast used: without Oral Contrast (none if empty)

 

FINDINGS: 

LOWER CHEST: Unremarkable

 

ABDOMEN

LIVER: Unremarkable

GALLBLADDER AND BILE DUCTS: Unremarkable.

PANCREAS: Unremarkable.

SPLEEN: Unremarkable.

ADRENAL GLANDS: Unremarkable.

KIDNEYS AND URETERS: No evidence of hydronephrosis or renal calculus. The ureters are unremarkable. P
robable right renal cyst. PELVIS

BLADDER: Unremarkable

REPRODUCTIVE: Unremarkable.

 

ABDOMEN & PELVIS

STOMACH AND BOWEL: No evidence of bowel obstruction. Scattered colonic diverticula. The appendix is v
isualized and has some mild inflammation changes around it measures at the upper limits of normal sli
ghtly enlarged for thickness at 9 mm. No organizing fluid collection.

PERITONEUM/RETROPERITONEUM: No evidence of pneumoperitoneum or free fluid.

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 Se
tricia atherosclerosis of the arterial vasculature.

MUSCULOSKELETAL: No acute osseous abnormalities

LYMPH NODES: No gross evidence for lymphadenopathy.

SOFT TISSUE/ABDOMINAL WALL: Unremarkable

 

IMPRESSION:

1.  Prominent appendix thickness with mild inflammation changes correlate for appendicitis.

2.  Colonic diverticulosis. No definitive evidence for diverticulitis. 

3.  No obstructive uropathy or renal calculus.

4.  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

## 2024-09-09 ENCOUNTER — HOSPITAL ENCOUNTER (INPATIENT)
Dept: HOSPITAL 47 - EC | Age: 69
LOS: 6 days | Discharge: HOME | DRG: 393 | End: 2024-09-15
Attending: FAMILY MEDICINE | Admitting: FAMILY MEDICINE
Payer: MEDICARE

## 2024-09-09 ENCOUNTER — HOSPITAL ENCOUNTER (OUTPATIENT)
Dept: HOSPITAL 47 - LABWHC1 | Age: 69
Discharge: HOME | End: 2024-09-09
Attending: NURSE PRACTITIONER
Payer: MEDICARE

## 2024-09-09 DIAGNOSIS — R10.31: ICD-10-CM

## 2024-09-09 DIAGNOSIS — R79.89: ICD-10-CM

## 2024-09-09 DIAGNOSIS — T36.95XA: ICD-10-CM

## 2024-09-09 DIAGNOSIS — Z79.82: ICD-10-CM

## 2024-09-09 DIAGNOSIS — Z87.19: ICD-10-CM

## 2024-09-09 DIAGNOSIS — I25.10: ICD-10-CM

## 2024-09-09 DIAGNOSIS — Z79.899: ICD-10-CM

## 2024-09-09 DIAGNOSIS — Z79.51: ICD-10-CM

## 2024-09-09 DIAGNOSIS — K92.1: ICD-10-CM

## 2024-09-09 DIAGNOSIS — E78.5: ICD-10-CM

## 2024-09-09 DIAGNOSIS — I49.3: ICD-10-CM

## 2024-09-09 DIAGNOSIS — T50.1X5A: ICD-10-CM

## 2024-09-09 DIAGNOSIS — I50.23: ICD-10-CM

## 2024-09-09 DIAGNOSIS — E87.3: ICD-10-CM

## 2024-09-09 DIAGNOSIS — I48.3: ICD-10-CM

## 2024-09-09 DIAGNOSIS — Z79.84: ICD-10-CM

## 2024-09-09 DIAGNOSIS — Z86.73: ICD-10-CM

## 2024-09-09 DIAGNOSIS — I48.91: ICD-10-CM

## 2024-09-09 DIAGNOSIS — Z87.891: ICD-10-CM
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
## 2024-09-09 DIAGNOSIS — R00.0: Primary | ICD-10-CM

## 2024-09-09 DIAGNOSIS — K35.80: Primary | ICD-10-CM

## 2024-09-09 DIAGNOSIS — K57.30: ICD-10-CM

## 2024-09-09 DIAGNOSIS — I25.2: ICD-10-CM

## 2024-09-09 DIAGNOSIS — Z88.5: ICD-10-CM

## 2024-09-09 DIAGNOSIS — Z79.01: ICD-10-CM

## 2024-09-09 DIAGNOSIS — I42.9: ICD-10-CM

## 2024-09-09 DIAGNOSIS — J44.9: ICD-10-CM

## 2024-09-09 DIAGNOSIS — R53.83: ICD-10-CM

## 2024-09-09 DIAGNOSIS — N40.0: ICD-10-CM

## 2024-09-09 DIAGNOSIS — I48.92: ICD-10-CM

## 2024-09-09 DIAGNOSIS — I11.0: ICD-10-CM

## 2024-09-09 DIAGNOSIS — R00.0: ICD-10-CM

## 2024-09-09 DIAGNOSIS — I48.0: ICD-10-CM

## 2024-09-09 LAB
ALBUMIN SERPL-MCNC: 3.1 G/DL (ref 3.5–5)
ALBUMIN SERPL-MCNC: 3.6 G/DL (ref 3.5–5)
ALBUMIN/GLOB SERPL: 1.3 {RATIO}
ALP SERPL-CCNC: 71 U/L (ref 38–126)
ALP SERPL-CCNC: 78 U/L (ref 38–126)
ALT SERPL-CCNC: 22 U/L (ref 4–49)
ALT SERPL-CCNC: 27 U/L (ref 4–49)
AMYLASE SERPL-CCNC: 35 U/L (ref 30–110)
AMYLASE SERPL-CCNC: 36 U/L (ref 30–110)
ANION GAP SERPL CALC-SCNC: 5 MMOL/L
ANION GAP SERPL CALC-SCNC: 6 MMOL/L
APTT BLD: 29.9 SEC (ref 22–30)
AST SERPL-CCNC: 27 U/L (ref 17–59)
AST SERPL-CCNC: 35 U/L (ref 17–59)
BASOPHILS # BLD AUTO: 0.1 K/UL (ref 0–0.2)
BASOPHILS # BLD AUTO: 0.1 K/UL (ref 0–0.2)
BASOPHILS NFR BLD AUTO: 1 %
BASOPHILS NFR BLD AUTO: 1 %
BUN SERPL-SCNC: 10 MG/DL (ref 9–20)
BUN SERPL-SCNC: 11 MG/DL (ref 9–20)
CALCIUM SPEC-MCNC: 8.8 MG/DL (ref 8.4–10.2)
CALCIUM SPEC-MCNC: 8.9 MG/DL (ref 8.4–10.2)
CHLORIDE SERPL-SCNC: 99 MMOL/L (ref 98–107)
CHLORIDE SERPL-SCNC: 99 MMOL/L (ref 98–107)
CO2 SERPL-SCNC: 29 MMOL/L (ref 22–30)
CO2 SERPL-SCNC: 32 MMOL/L (ref 22–30)
EOSINOPHIL # BLD AUTO: 0.2 K/UL (ref 0–0.7)
EOSINOPHIL # BLD AUTO: 0.2 K/UL (ref 0–0.7)
EOSINOPHIL NFR BLD AUTO: 1 %
EOSINOPHIL NFR BLD AUTO: 2 %
ERYTHROCYTE [DISTWIDTH] IN BLOOD BY AUTOMATED COUNT: 4.72 M/UL (ref 4.3–5.9)
ERYTHROCYTE [DISTWIDTH] IN BLOOD BY AUTOMATED COUNT: 4.93 M/UL (ref 4.3–5.9)
ERYTHROCYTE [DISTWIDTH] IN BLOOD: 13.1 % (ref 11.5–15.5)
ERYTHROCYTE [DISTWIDTH] IN BLOOD: 13.1 % (ref 11.5–15.5)
GLOBULIN SER CALC-MCNC: 2.3 G/DL
GLUCOSE SERPL-MCNC: 101 MG/DL (ref 74–99)
GLUCOSE SERPL-MCNC: 101 MG/DL (ref 74–99)
HCT VFR BLD AUTO: 43.1 % (ref 39–53)
HCT VFR BLD AUTO: 45.7 % (ref 39–53)
HGB BLD-MCNC: 14.5 GM/DL (ref 13–17.5)
HGB BLD-MCNC: 15.1 GM/DL (ref 13–17.5)
INR PPP: 1.2 (ref ?–1.2)
LIPASE SERPL-CCNC: 61 U/L (ref 23–300)
LIPASE SERPL-CCNC: 64 U/L (ref 23–300)
LYMPHOCYTES # SPEC AUTO: 1.3 K/UL (ref 1–4.8)
LYMPHOCYTES # SPEC AUTO: 1.4 K/UL (ref 1–4.8)
LYMPHOCYTES NFR SPEC AUTO: 13 %
LYMPHOCYTES NFR SPEC AUTO: 13 %
MAGNESIUM SPEC-SCNC: 1.8 MG/DL (ref 1.6–2.3)
MCH RBC QN AUTO: 30.6 PG (ref 25–35)
MCH RBC QN AUTO: 30.7 PG (ref 25–35)
MCHC RBC AUTO-ENTMCNC: 33 G/DL (ref 31–37)
MCHC RBC AUTO-ENTMCNC: 33.6 G/DL (ref 31–37)
MCV RBC AUTO: 91.4 FL (ref 80–100)
MCV RBC AUTO: 92.8 FL (ref 80–100)
MONOCYTES # BLD AUTO: 0.7 K/UL (ref 0–1)
MONOCYTES # BLD AUTO: 0.7 K/UL (ref 0–1)
MONOCYTES NFR BLD AUTO: 6 %
MONOCYTES NFR BLD AUTO: 7 %
NEUTROPHILS # BLD AUTO: 7.8 K/UL (ref 1.3–7.7)
NEUTROPHILS # BLD AUTO: 8.7 K/UL (ref 1.3–7.7)
NEUTROPHILS NFR BLD AUTO: 76 %
NEUTROPHILS NFR BLD AUTO: 78 %
PLATELET # BLD AUTO: 352 K/UL (ref 150–450)
PLATELET # BLD AUTO: 395 K/UL (ref 150–450)
POTASSIUM SERPL-SCNC: 4 MMOL/L (ref 3.5–5.1)
POTASSIUM SERPL-SCNC: 4.3 MMOL/L (ref 3.5–5.1)
PROT SERPL-MCNC: 5.4 G/DL (ref 6.3–8.2)
PROT SERPL-MCNC: 6.1 G/DL (ref 6.3–8.2)
PT BLD: 12.4 SEC (ref 10–12.5)
SODIUM SERPL-SCNC: 134 MMOL/L (ref 137–145)
SODIUM SERPL-SCNC: 136 MMOL/L (ref 137–145)
WBC # BLD AUTO: 10.3 K/UL (ref 3.8–10.6)
WBC # BLD AUTO: 11.2 K/UL (ref 3.8–10.6)

## 2024-09-09 PROCEDURE — 71045 X-RAY EXAM CHEST 1 VIEW: CPT

## 2024-09-09 PROCEDURE — 85610 PROTHROMBIN TIME: CPT

## 2024-09-09 PROCEDURE — 74019 RADEX ABDOMEN 2 VIEWS: CPT

## 2024-09-09 PROCEDURE — 82552 ASSAY OF CPK IN BLOOD: CPT

## 2024-09-09 PROCEDURE — 96376 TX/PRO/DX INJ SAME DRUG ADON: CPT

## 2024-09-09 PROCEDURE — 82150 ASSAY OF AMYLASE: CPT

## 2024-09-09 PROCEDURE — 99285 EMERGENCY DEPT VISIT HI MDM: CPT

## 2024-09-09 PROCEDURE — 83605 ASSAY OF LACTIC ACID: CPT

## 2024-09-09 PROCEDURE — 96361 HYDRATE IV INFUSION ADD-ON: CPT

## 2024-09-09 PROCEDURE — 83690 ASSAY OF LIPASE: CPT

## 2024-09-09 PROCEDURE — 96366 THER/PROPH/DIAG IV INF ADDON: CPT

## 2024-09-09 PROCEDURE — 96365 THER/PROPH/DIAG IV INF INIT: CPT

## 2024-09-09 PROCEDURE — 93306 TTE W/DOPPLER COMPLETE: CPT

## 2024-09-09 PROCEDURE — 80053 COMPREHEN METABOLIC PANEL: CPT

## 2024-09-09 PROCEDURE — 93017 CV STRESS TEST TRACING ONLY: CPT

## 2024-09-09 PROCEDURE — 85025 COMPLETE CBC W/AUTO DIFF WBC: CPT

## 2024-09-09 PROCEDURE — 82272 OCCULT BLD FECES 1-3 TESTS: CPT

## 2024-09-09 PROCEDURE — 36415 COLL VENOUS BLD VENIPUNCTURE: CPT

## 2024-09-09 PROCEDURE — 96368 THER/DIAG CONCURRENT INF: CPT

## 2024-09-09 PROCEDURE — 96375 TX/PRO/DX INJ NEW DRUG ADDON: CPT

## 2024-09-09 PROCEDURE — 74177 CT ABD & PELVIS W/CONTRAST: CPT

## 2024-09-09 PROCEDURE — 82550 ASSAY OF CK (CPK): CPT

## 2024-09-09 PROCEDURE — 84484 ASSAY OF TROPONIN QUANT: CPT

## 2024-09-09 PROCEDURE — 71046 X-RAY EXAM CHEST 2 VIEWS: CPT

## 2024-09-09 PROCEDURE — 84443 ASSAY THYROID STIM HORMONE: CPT

## 2024-09-09 PROCEDURE — 78452 HT MUSCLE IMAGE SPECT MULT: CPT

## 2024-09-09 PROCEDURE — 83735 ASSAY OF MAGNESIUM: CPT

## 2024-09-09 PROCEDURE — 80048 BASIC METABOLIC PNL TOTAL CA: CPT

## 2024-09-09 PROCEDURE — 94640 AIRWAY INHALATION TREATMENT: CPT

## 2024-09-09 PROCEDURE — 93005 ELECTROCARDIOGRAM TRACING: CPT

## 2024-09-09 PROCEDURE — 94760 N-INVAS EAR/PLS OXIMETRY 1: CPT

## 2024-09-09 PROCEDURE — 85730 THROMBOPLASTIN TIME PARTIAL: CPT

## 2024-09-09 RX ADMIN — PANTOPRAZOLE SODIUM SCH MG: 40 INJECTION, POWDER, FOR SOLUTION INTRAVENOUS at 21:18

## 2024-09-09 RX ADMIN — PANTOPRAZOLE SODIUM STA MG: 40 INJECTION, POWDER, FOR SOLUTION INTRAVENOUS at 13:28

## 2024-09-09 RX ADMIN — ATORVASTATIN CALCIUM SCH MG: 40 TABLET, FILM COATED ORAL at 21:17

## 2024-09-09 RX ADMIN — CEFAZOLIN STA MLS/HR: 330 INJECTION, POWDER, FOR SOLUTION INTRAMUSCULAR; INTRAVENOUS at 13:28

## 2024-09-09 RX ADMIN — ISODIUM CHLORIDE PRN MG: 0.03 SOLUTION RESPIRATORY (INHALATION) at 22:12

## 2024-09-09 RX ADMIN — CEFAZOLIN SCH: 330 INJECTION, POWDER, FOR SOLUTION INTRAMUSCULAR; INTRAVENOUS at 17:14

## 2024-09-09 RX ADMIN — LATANOPROST SCH DROPS: 50 SOLUTION OPHTHALMIC at 22:21

## 2024-09-09 RX ADMIN — PIPERACILLIN AND TAZOBACTAM SCH MLS/HR: 3; .375 INJECTION, POWDER, FOR SOLUTION INTRAVENOUS at 17:08

## 2024-09-09 NOTE — XR
EXAMINATION TYPE: XR abdomen 2V

 

DATE OF EXAM: 9/9/2024

 

HISTORY: Pain.

 

Technique:  3 views of the abdomen are submitted.

 

Comparison: None.

 

Findings:

There is no convincing evidence of pneumoperitoneum. 

 

The Bowel gas pattern is nonspecific and nonobstructive.  

 

No sizable air-fluid levels are seen.  

 

No mass effects are noted.  

 

No renal calcifications are identified. 

 

IMPRESSION: 

 

1. Nonspecific nonobstructive bowel gas pattern

## 2024-09-09 NOTE — P.HPIM
History of Present Illness


H&P Date: 09/09/24





69 year old M with PMH of CAD, AFib on Xarelto, COPD, BPH, HTN presents to the 

ED for abnormal lab work. He reports melenotic stools and diarrhea (> 8 

episodes/day) that started over Labor Day weekend. Symptoms were associated with

periumbillical pain. Symptoms lasted for 4 days. His PCP ordered a CT AP which 

showed prominent appendix thickness with mild inflammatory changes correlate for

appendicitis, diverticulosis and saccular aneurysm arising from the right 

internal iliac artery measuring 22 mm. He was started on Cipro and Flagyl which 

improved his symptoms. His symptoms spontaneously returned today. Wife reports 

fatigue and sleepiness which is out of character for him. He had lab work done 

today which resulted in a Troponin of 0.081 and was told to come to the ED. He 

denies any chest pain. In the ED he underwent extensive evaluation. /90, 

HR 76, T 97.4F, RR 16, 91% on RA. CBC, Coag panel, CMP significant for WBC 11.2,

INR 1.2, Na 134, glu 101, total protein 6.1. Troponin 0.076, EKG sinus rhythm 

with PVCs and Q wave in V1 and V2. CXR negative. KUB non specific bowel gas pat

tern. Patient is admitted for further workup and management.





General: Nontoxic, no distress, appears at stated age


Derm: Warm, dry


Head: Atraumatic, normocephalic, symmetric


Eyes: EOMI, no lid lag, anicteric sclera


Mouth: No lip lesion, mucus membranes moist


Cardiovascular: RRR. Normal S1 S2. No murmurs, rubs, gallops


Lungs: Decreased BS BL, no accessory muscle use, 2L NC


Abdominal: Soft, non distended, TTP periumbillical area and RLQ without rebound 


Ext: No gross muscle atrophy, no edema, no contractures


Psych: Alert, oriented, appropriate affect





Based on my assessment of this patient, this patient meets a high complexity 

level of care.





Acute appendicitis: As seen on CT AP. Mild leukocytosis. Start Zosyn 3.375g IV 

TID. Does not meet sepsis criteria. NS at 50 cc/hr. Consult Surgery.


Melena: Obtain stool occult blood. C-scope 2015 sigmoid diverticulosis. Protonix

40 mg IV BID. Repeat CBC tomorrow morning.


Troponin elevation: Flat. Trend Trop/EKG to rule out ACS. Obtain Echo. Telemetry

monitoring. Cardiology consult.


Fatigue: Possibly related to above. Obtain TSH.


CAD: Echo 2021 EF 50-55% with moderate concentric LVH, basal inferior LV 

hypokinesis. Hold ASA. Metoprolol 50 mg PO QD. Lipitor 40 mg PO QHS. 


AFib on Xarelto: Hold Xarelto. Metoprolol as above.


COPD: Albuterol neb Q6H PRN SOB/wheezing. No in acute exacerbation.


BPH: Flomax 0.4 mg PO QD.


HTN: Imdur 30 mg PO QD. Amlodipine 10 mg PO QD. Metoprolol as above.





CODE STATUS: FULL CODE


DVT Prophylaxis: SCD


GI Prophylaxis: Protonix IV


Designated medical POA if patient is not able to make medical decisions for 

themselves: Wife





I have reviewed the following consultant notes: ED note


I have reviewed the results of the following tests: As above.


I have ordered the following tests: As above.


I have discussed the care of this patient with the following independent 

historian: Wife


I have independently interpreted the following test below: CXR


I have discussed the management of this patient with the following physician: 

Dr. Duggan








Past Medical History


Past Medical History: Coronary Artery Disease (CAD), CVA/TIA, Hyperlipidemia, 

Hypertension, Myocardial Infarction (MI)


Additional Past Medical History / Comment(s): AUG 28, 14.


Last Myocardial Infarction Date:: UNKNOWN


History of Any Multi-Drug Resistant Organisms: None Reported


Past Surgical History: Tonsillectomy


Additional Past Surgical History / Comment(s): ventral hernia repair


Past Anesthesia/Blood Transfusion Reactions: No Reported Reaction


Past Psychological History: No Psychological Hx Reported


Smoking Status: Former smoker


Past Alcohol Use History: None Reported


Past Drug Use History: None Reported





- Past Family History


  ** Mother


Family Medical History: Deep Vein Thrombosis (DVT)





Medications and Allergies


                                Home Medications











 Medication  Instructions  Recorded  Confirmed  Type


 


Losartan Potassium 100 mg PO DAILY 09/30/14 09/09/24 History


 


Tamsulosin HCl 0.4 mg PO DAILY 05/01/15 09/09/24 History


 


Ubidecarenone [Co Q-10] 100 mg PO DAILY 05/01/15 09/09/24 History


 


Vitamin B Complex 1 cap PO DAILY 05/01/15 09/09/24 History


 


Glucosamine Sulfate 500 mg PO DAILY 09/13/18 09/09/24 History


 


Isosorbide Mononitrate [Isosorbide 30 mg PO DAILY 09/13/18 09/09/24 History





Mononitrate ER]    


 


Albuterol Nebulized [Ventolin 2.5 mg INHALATION RT-Q6H PRN 04/13/21 09/09/24 

History





Nebulized]    


 


Furosemide [Lasix] 40 mg PO DAILY PRN 04/13/21 09/09/24 History


 


amLODIPine [Norvasc] 10 mg PO DAILY 04/13/21 09/09/24 History


 


Aspirin 81 mg PO DAILY #90 chewable 04/14/21 09/09/24 Rx


 


Nitroglycerin Sl Tabs [Nitrostat] 0.4 mg SUBLINGUAL Q5M PRN #25 tab 04/14/21 09/09/24 Rx


 


Atorvastatin Calcium [Lipitor] 40 mg PO HS 09/09/24 09/09/24 History


 


Budesonide/Glycopyr/Formoterol 2 puff INHALATION RT-BID 09/09/24 09/09/24 

History





[Breztri Aerosphere Inhaler]    


 


Ciprofloxacin HCl [Cipro] 500 mg PO Q12HR 09/09/24 09/09/24 History


 


Dapagliflozin Propanediol [Farxiga] 10 mg PO DAILY 09/09/24 09/09/24 History


 


Latanoprost [Latanoprost 0.005%] 1 drop BOTH EYES HS 09/09/24 09/09/24 History


 


Metoprolol Succinate (ER) [Toprol 50 mg PO DAILY 09/09/24 09/09/24 History





Xl]    


 


Rivaroxaban [Xarelto] 20 mg PO DAILY 09/09/24 09/09/24 History


 


hydrOXYzine HCL [Atarax] 25 mg PO HS 09/09/24 09/09/24 History


 


metroNIDAZOLE [Flagyl] 500 mg PO TID 09/09/24 09/09/24 History








                                    Allergies











Allergy/AdvReac Type Severity Reaction Status Date / Time


 


naproxen sodium [From Aleve] Allergy  Rash/Hives Verified 09/09/24 15:18














Physical Exam


Vitals: 


                                   Vital Signs











  Temp Pulse Resp BP Pulse Ox


 


 09/09/24 14:46   65  16  124/87  93 L


 


 09/09/24 12:37  97.4 F L  76  16  130/90  91 L








                                Intake and Output











 09/09/24 09/09/24 09/09/24





 06:59 14:59 22:59


 


Other:   


 


  Weight  77.111 kg 














Results


CBC & Chem 7: 


                                 09/09/24 13:26





                                 09/09/24 13:26


Labs: 


                  Abnormal Lab Results - Last 24 Hours (Table)











  09/09/24 09/09/24 09/09/24 Range/Units





  13:26 13:26 13:26 


 


WBC  11.2 H    (3.8-10.6)  k/uL


 


Neutrophils #  8.7 H    (1.3-7.7)  k/uL


 


INR    1.2 H  (<1.2)  


 


Sodium   134 L   (137-145)  mmol/L


 


Glucose   101 H   (74-99)  mg/dL


 


Troponin I     (0.000-0.034)  ng/mL


 


Total Protein   6.1 L   (6.3-8.2)  g/dL














  09/09/24 Range/Units





  13:26 


 


WBC   (3.8-10.6)  k/uL


 


Neutrophils #   (1.3-7.7)  k/uL


 


INR   (<1.2)  


 


Sodium   (137-145)  mmol/L


 


Glucose   (74-99)  mg/dL


 


Troponin I  0.076 H*  (0.000-0.034)  ng/mL


 


Total Protein   (6.3-8.2)  g/dL

## 2024-09-09 NOTE — XR
EXAMINATION TYPE: XR chest 2V

 

DATE OF EXAM: 9/9/2024

 

COMPARISON: 4/13/21

 

HISTORY: Shortness of breath

 

TECHNIQUE:  Frontal and lateral views of the chest are obtained.

 

FINDINGS:

 

Scattered senescent parenchymal changes noted. Hyperinflation compatible with COPD. 

 

No evidence for infiltrate. No evidence for atelectasis.

 

Heart size is stable.

 

Mediastinal structures are stable and grossly unremarkable.

 

No evidence for hilar prominence.

 

Degenerative changes dorsal spine. 

 

IMPRESSION:

1. No evidence for acute pulmonary disease.

## 2024-09-09 NOTE — ED
General Adult HPI





- General


Chief complaint: Recheck/Abnormal Lab/Rx


Stated complaint: PCP referral


Time Seen by Provider: 09/09/24 13:04


Source: patient, RN notes reviewed


Mode of arrival: ambulatory


Limitations: no limitations





- History of Present Illness


Initial comments: 





Patient is a 69-year-old male presenting to the emergency department with abnor

mal labs.  Patient had blood work done today with reported abnormal troponin.  

Patient has had fatigue over the past month.  Patient did have dark stool 

several weeks ago followed by abdominal discomfort.  Outpatient CT scan was 

concerning for possible appendicitis and patient was advised to follow-up with 

surgery.  Patient states abdominal discomfort has near resolved at this time.  

Patient does have dyspnea at this worse with exertion however this is chronic 

and unchanged, no chest pain.





- Related Data


                                Home Medications











 Medication  Instructions  Recorded  Confirmed


 


Losartan Potassium 100 mg PO DAILY 09/30/14 04/13/21


 


Metoprolol Succinate 25 mg PO DAILY 09/30/14 04/13/21


 


Tamsulosin HCl 0.4 mg PO DAILY 05/01/15 04/13/21


 


Ubidecarenone [Co Q-10] 100 mg PO DAILY 05/01/15 04/13/21


 


Vitamin B Complex 1 cap PO DAILY 05/01/15 04/13/21


 


Glucosamine Sulfate 500 mg PO DAILY 09/13/18 04/13/21


 


Isosorbide Mononitrate [Isosorbide 30 mg PO DAILY 09/13/18 04/13/21





Mononitrate ER]   


 


Albuterol Nebulized [Ventolin 2.5 mg INHALATION RT-Q6H PRN 04/13/21 04/13/21





Nebulized]   


 


Atorvastatin Calcium [Lipitor] 20 mg PO DAILY 04/13/21 04/13/21


 


Furosemide [Lasix] 40 mg PO DAILY PRN 04/13/21 04/13/21


 


amLODIPine [Norvasc] 10 mg PO DAILY 04/13/21 04/13/21


 


metFORMIN HCL [Glucophage] 500 mg PO DAILY 04/13/21 04/13/21








                                  Previous Rx's











 Medication  Instructions  Recorded


 


Aspirin 81 mg PO DAILY #90 chewable 04/14/21


 


Nitroglycerin Sl Tabs [Nitrostat] 0.4 mg SUBLINGUAL Q5M PRN #25 tab 04/14/21


 


Budesonide/Formoterol Fumarate 1 puff INHALATION BID #1 inhaler 04/15/21





[Symbicort 80-4.5 Mcg Inhaler]  


 


Ipratropium-Albuterol Nebulize 3 ml INHALATION TID #90 ml 04/15/21





[Duoneb 0.5 mg-3 mg/3 ml Soln]  


 


predniSONE 10 mg PO DAILY #30 tab 04/15/21











                                    Allergies











Allergy/AdvReac Type Severity Reaction Status Date / Time


 


naproxen sodium [From Aleve] Allergy  Rash/Hives Verified 09/09/24 12:39














Review of Systems


ROS Statement: 


Those systems with pertinent positive or pertinent negative responses have been 

documented in the HPI.





ROS Other: All systems not noted in ROS Statement are negative.


Constitutional: Denies: fever


Eyes: Denies: eye pain


ENT: Denies: ear pain


Respiratory: Reports: as per HPI


Cardiovascular: Denies: chest pain


Endocrine: Denies: fatigue


Gastrointestinal: Reports: as per HPI





Past Medical History


Past Medical History: Coronary Artery Disease (CAD), CVA/TIA, Hyperlipidemia, 

Hypertension, Myocardial Infarction (MI)


Additional Past Medical History / Comment(s): AUG 28, 14.


Last Myocardial Infarction Date:: UNKNOWN


History of Any Multi-Drug Resistant Organisms: None Reported


Past Surgical History: Tonsillectomy


Additional Past Surgical History / Comment(s): ventral hernia repair


Past Anesthesia/Blood Transfusion Reactions: No Reported Reaction


Past Psychological History: No Psychological Hx Reported


Smoking Status: Former smoker


Past Alcohol Use History: None Reported


Past Drug Use History: None Reported





- Past Family History


  ** Mother


Family Medical History: Deep Vein Thrombosis (DVT)





General Exam


Limitations: no limitations


General appearance: alert, in no apparent distress


Head exam: Present: normocephalic


Eye exam: Present: normal appearance


Neck exam: Present: normal inspection


Respiratory exam: Present: normal lung sounds bilaterally


Cardiovascular Exam: Present: regular rate, normal rhythm


  ** Expanded


Peripheral pulses: 2+: Dorsalis Pedis (R), Dorsalis Pedis (L)


GI/Abdominal exam: Present: soft.  Absent: distended, tenderness, pulsatile mass


Extremities exam: Present: normal inspection.  Absent: pedal edema, calf 

tenderness


Neurological exam: Present: alert


Psychiatric exam: Present: normal affect, normal mood


Skin exam: Present: normal color





Course


                                   Vital Signs











  09/09/24 09/09/24





  12:37 14:46


 


Temperature 97.4 F L 


 


Pulse Rate 76 65


 


Respiratory 16 16





Rate  


 


Blood Pressure 130/90 124/87


 


O2 Sat by Pulse 91 L 93 L





Oximetry  














EKG Findings





- EKG Results:


EKG: interpreted by ERMD (Q wave V1 V2.  Nonspecific T waves. frequent premature

supraventricular complexes), sinus rhythm, normal axis





Medical Decision Making





- Medical Decision Making





Macro was pt. sent in by a medical professional or institution (, PA, NP, 

urgent care, hospital, or nursing home...) When possible be specific


@  -Patient was sent in by primary care physician office


Did you speak to anyone other than the patient for history (EMS, parent, family,

police, friend...)? What history was obtained from this source 


@  -Wife is present helps provide history including recent abnormal CT


Did you review nursing and triage notes (agree or disagree)?  Why? 


@  -I reviewed and agree with nursing and triage notes


Were old charts reviewed (outside hosp., previous admission, EMS record, old 

EKG, old radiological studies, urgent care reports/EKG's, nursing home records)?

Report findings 


@  -CT scan reviewed from 3 days ago with concern for appendicitis


Differential Diagnosis (chest pain, altered mental status, abdominal pain women,

abdominal pain men, vaginal bleeding, weakness, fever, dyspnea, syncope, 

headache, dizziness, GI bleed, back pain, seizure, CVA, palpatations, mental 

health, musculoskeletal)? 


@  -Differential Chest Pain:


Stable Angina, Unstable Angina, STEMI, NSTEMI Aortic Dissection, Pneumothorax, 

Musculoskeletal, Esophageal Spasm GERD, Cholecystitis, Pancreatitis, Zoster, 

this is not meant to be an all-inclusive list. 


Differential Abdominal Pain Men:


Appendicitis, cholecystitis, diverticulosis, ischemic bowel, pancreatitis, hepa

titis, UTI, gastroenteritis, AAA, incarcerated hernia, bowel obstruction, 

constipation, inflammatory bowel, hepatitis, peptic ulcer disease, splenic 

infarction, perforated viscus, testicular torsion, this is not meant to be an 

all-inclusive list





EKG interpreted by me (3pts min.).


@  -As above


X-rays interpreted by me (1pt min.).


@  -Chest and abdominal x-rays do not reveal acute abnormality


CT interpreted by me (1pt min.).


@  -None done


U/S interpreted by me (1pt. min.).


@  -None done


What testing was considered but not performed or refused? (CT, X-rays, U/S, 

labs)? Why?


@  -None


What meds were considered but not given or refused? Why?


@  -Considered heparin however recent abnormal CT scan.  Patient will be 

admitted with consult


Did you discuss the management of the patient with other professionals 

(professionals i.e. , PA, NP, lab, RT, psych nurse, , , 

teacher, , )? Give summary


@  -Case was discussed with Dr. Martinez who will admit covering Dr. Ramon


Was smoking cessation discussed for >3mins.?


@  -No


Was critical care preformed (if so, how long)?


@  -No


Were there social determinants of health that impacted care today? How? (Homeles

sness, low income, unemployed, alcoholism, drug addiction, transportation, low 

edu. Level, literacy, decrease access to med. care, [facility], rehab)?


@  -No


Was there de-escalation of care discussed even if they declined (Discuss DNR or 

withdrawal of care, Hospice)? DNR status


@  -No


What co-morbidities impacted this encounter? (DM, HTN, Smoking, COPD, CAD, 

Cancer, CVA, ARF, Chemo, Hep., AIDS, mental health diagnosis, sleep apnea, 

morbid obesity)?


@  -None


Was patient admitted / discharged? Hospital course, mention meds given and 

route, prescriptions, significant lab abnormalities, going to OR and other 

pertinent info.


@  -Patient presents with fatigue and concern with abnormal troponin.  Repeat 

troponin is similar.  Patient will be admitted with cardiac consult.  Also 

recent CT scan with abnormality and surgical consult will be placed.  Patient 

was advised to see Dr. Cyr as an outpatient


Undiagnosed new problem with uncertain prognosis?


@  -No


Drug Therapy requiring intensive monitoring for toxicity (Heparin, Nitro, 

Insulin, Cardizem)?


@  -No


Were any procedures done?


@  -No


Diagnosis/symptom?


@  -Fatigue, indeterminate troponin


Acute, or Chronic, or Acute on Chronic?


@  -Acute, acute


Uncomplicated (without systemic symptoms) or Complicated (systemic symptoms)?


@  -with recent abnormal CT scan


Side effects of treatment?


@  -No


Exacerbation, Progression, or Severe Exacerbation?


@  -No


Poses a threat to life or bodily function? How? (Chest pain, USA, MI, pneumonia,

PE, COPD, DKA, ARF, appy, cholecystitis, CVA, Diverticulitis, Homicidal, 

Suicidal, threat to staff... and all critical care pts)


@  -Threat to cardiac and bowel function








- Lab Data


Result diagrams: 


                                 09/09/24 13:26





                                 09/09/24 13:26


                                   Lab Results











  09/09/24 09/09/24 09/09/24 Range/Units





  13:26 13:26 13:26 


 


WBC  11.2 H    (3.8-10.6)  k/uL


 


RBC  4.72    (4.30-5.90)  m/uL


 


Hgb  14.5    (13.0-17.5)  gm/dL


 


Hct  43.1    (39.0-53.0)  %


 


MCV  91.4    (80.0-100.0)  fL


 


MCH  30.7    (25.0-35.0)  pg


 


MCHC  33.6    (31.0-37.0)  g/dL


 


RDW  13.1    (11.5-15.5)  %


 


Plt Count  352    (150-450)  k/uL


 


MPV  7.2    


 


Neutrophils %  78    %


 


Lymphocytes %  13    %


 


Monocytes %  6    %


 


Eosinophils %  1    %


 


Basophils %  1    %


 


Neutrophils #  8.7 H    (1.3-7.7)  k/uL


 


Lymphocytes #  1.4    (1.0-4.8)  k/uL


 


Monocytes #  0.7    (0-1.0)  k/uL


 


Eosinophils #  0.2    (0-0.7)  k/uL


 


Basophils #  0.1    (0-0.2)  k/uL


 


PT    12.4  (10.0-12.5)  sec


 


INR    1.2 H  (<1.2)  


 


APTT    29.9  (22.0-30.0)  sec


 


Sodium   134 L   (137-145)  mmol/L


 


Potassium   4.0   (3.5-5.1)  mmol/L


 


Chloride   99   ()  mmol/L


 


Carbon Dioxide   29   (22-30)  mmol/L


 


Anion Gap   6   mmol/L


 


BUN   10   (9-20)  mg/dL


 


Creatinine   0.73   (0.66-1.25)  mg/dL


 


Est GFR (CKD-EPI)AfAm   >90   (>60 ml/min/1.73 sqM)  


 


Est GFR (CKD-EPI)NonAf   >90   (>60 ml/min/1.73 sqM)  


 


Glucose   101 H   (74-99)  mg/dL


 


Calcium   8.9   (8.4-10.2)  mg/dL


 


Total Bilirubin   0.7   (0.2-1.3)  mg/dL


 


AST   35   (17-59)  U/L


 


ALT   27   (4-49)  U/L


 


Alkaline Phosphatase   78   ()  U/L


 


Troponin I     (0.000-0.034)  ng/mL


 


Total Protein   6.1 L   (6.3-8.2)  g/dL


 


Albumin   3.6   (3.5-5.0)  g/dL


 


Amylase   35   ()  U/L


 


Lipase   64   ()  U/L














  09/09/24 Range/Units





  13:26 


 


WBC   (3.8-10.6)  k/uL


 


RBC   (4.30-5.90)  m/uL


 


Hgb   (13.0-17.5)  gm/dL


 


Hct   (39.0-53.0)  %


 


MCV   (80.0-100.0)  fL


 


MCH   (25.0-35.0)  pg


 


MCHC   (31.0-37.0)  g/dL


 


RDW   (11.5-15.5)  %


 


Plt Count   (150-450)  k/uL


 


MPV   


 


Neutrophils %   %


 


Lymphocytes %   %


 


Monocytes %   %


 


Eosinophils %   %


 


Basophils %   %


 


Neutrophils #   (1.3-7.7)  k/uL


 


Lymphocytes #   (1.0-4.8)  k/uL


 


Monocytes #   (0-1.0)  k/uL


 


Eosinophils #   (0-0.7)  k/uL


 


Basophils #   (0-0.2)  k/uL


 


PT   (10.0-12.5)  sec


 


INR   (<1.2)  


 


APTT   (22.0-30.0)  sec


 


Sodium   (137-145)  mmol/L


 


Potassium   (3.5-5.1)  mmol/L


 


Chloride   ()  mmol/L


 


Carbon Dioxide   (22-30)  mmol/L


 


Anion Gap   mmol/L


 


BUN   (9-20)  mg/dL


 


Creatinine   (0.66-1.25)  mg/dL


 


Est GFR (CKD-EPI)AfAm   (>60 ml/min/1.73 sqM)  


 


Est GFR (CKD-EPI)NonAf   (>60 ml/min/1.73 sqM)  


 


Glucose   (74-99)  mg/dL


 


Calcium   (8.4-10.2)  mg/dL


 


Total Bilirubin   (0.2-1.3)  mg/dL


 


AST   (17-59)  U/L


 


ALT   (4-49)  U/L


 


Alkaline Phosphatase   ()  U/L


 


Troponin I  0.076 H*  (0.000-0.034)  ng/mL


 


Total Protein   (6.3-8.2)  g/dL


 


Albumin   (3.5-5.0)  g/dL


 


Amylase   ()  U/L


 


Lipase   ()  U/L














Disposition


Clinical Impression: 


 Elevated troponin level





Disposition: ADMITTED AS IP TO THIS HOSP


Is patient prescribed a controlled substance at d/c from ED?: No


Referrals: 


Brian Ramon MD [Primary Care Provider] - 1-2 days


Time of Disposition: 15:21

## 2024-09-10 LAB
ALBUMIN SERPL-MCNC: 3.4 G/DL (ref 3.5–5)
ALP SERPL-CCNC: 82 U/L (ref 38–126)
ALT SERPL-CCNC: 25 U/L (ref 4–49)
ANION GAP SERPL CALC-SCNC: 8 MMOL/L
AST SERPL-CCNC: 29 U/L (ref 17–59)
BASOPHILS # BLD AUTO: 0.1 K/UL (ref 0–0.2)
BASOPHILS NFR BLD AUTO: 1 %
BUN SERPL-SCNC: 6 MG/DL (ref 9–20)
CALCIUM SPEC-MCNC: 8.7 MG/DL (ref 8.4–10.2)
CHLORIDE SERPL-SCNC: 101 MMOL/L (ref 98–107)
CO2 SERPL-SCNC: 28 MMOL/L (ref 22–30)
EOSINOPHIL # BLD AUTO: 0.2 K/UL (ref 0–0.7)
EOSINOPHIL NFR BLD AUTO: 1 %
ERYTHROCYTE [DISTWIDTH] IN BLOOD BY AUTOMATED COUNT: 5.08 M/UL (ref 4.3–5.9)
ERYTHROCYTE [DISTWIDTH] IN BLOOD: 13.4 % (ref 11.5–15.5)
GLUCOSE SERPL-MCNC: 86 MG/DL (ref 74–99)
HCT VFR BLD AUTO: 46.7 % (ref 39–53)
HGB BLD-MCNC: 15.6 GM/DL (ref 13–17.5)
LYMPHOCYTES # SPEC AUTO: 1.2 K/UL (ref 1–4.8)
LYMPHOCYTES NFR SPEC AUTO: 10 %
MCH RBC QN AUTO: 30.7 PG (ref 25–35)
MCHC RBC AUTO-ENTMCNC: 33.4 G/DL (ref 31–37)
MCV RBC AUTO: 91.8 FL (ref 80–100)
MONOCYTES # BLD AUTO: 0.7 K/UL (ref 0–1)
MONOCYTES NFR BLD AUTO: 6 %
NEUTROPHILS # BLD AUTO: 9.2 K/UL (ref 1.3–7.7)
NEUTROPHILS NFR BLD AUTO: 80 %
PLATELET # BLD AUTO: 356 K/UL (ref 150–450)
POTASSIUM SERPL-SCNC: 3.8 MMOL/L (ref 3.5–5.1)
PROT SERPL-MCNC: 5.7 G/DL (ref 6.3–8.2)
SODIUM SERPL-SCNC: 137 MMOL/L (ref 137–145)
WBC # BLD AUTO: 11.4 K/UL (ref 3.8–10.6)

## 2024-09-10 RX ADMIN — METOPROLOL SUCCINATE SCH: 50 TABLET, EXTENDED RELEASE ORAL at 10:07

## 2024-09-10 RX ADMIN — TAMSULOSIN HYDROCHLORIDE SCH: 0.4 CAPSULE ORAL at 10:07

## 2024-09-10 RX ADMIN — AMIODARONE HYDROCHLORIDE ONE MLS/HR: 50 INJECTION, SOLUTION INTRAVENOUS at 09:30

## 2024-09-10 RX ADMIN — ISOSORBIDE MONONITRATE SCH: 30 TABLET, EXTENDED RELEASE ORAL at 10:06

## 2024-09-10 RX ADMIN — DAPAGLIFLOZIN SCH: 10 TABLET, FILM COATED ORAL at 10:06

## 2024-09-10 RX ADMIN — AMIODARONE HYDROCHLORIDE SCH MLS/HR: 50 INJECTION, SOLUTION INTRAVENOUS at 15:52

## 2024-09-10 RX ADMIN — HEPARIN SODIUM SCH MLS/HR: 10000 INJECTION, SOLUTION INTRAVENOUS at 17:25

## 2024-09-10 RX ADMIN — DILTIAZEM HYDROCHLORIDE ONE MG: 5 INJECTION INTRAVENOUS at 02:08

## 2024-09-10 RX ADMIN — AMIODARONE HYDROCHLORIDE ONE MLS/HR: 50 INJECTION, SOLUTION INTRAVENOUS at 09:13

## 2024-09-10 RX ADMIN — DILTIAZEM HYDROCHLORIDE SCH MLS/HR: 5 INJECTION INTRAVENOUS at 02:08

## 2024-09-10 RX ADMIN — HEPARIN SODIUM PRN UNIT: 1000 INJECTION, SOLUTION INTRAVENOUS; SUBCUTANEOUS at 17:26

## 2024-09-10 NOTE — P.CRDCN
History of Present Illness


Consult date: 09/10/24


Consult reason: other (Elevated troponin)


History of present illness: 


HISTORY OF PRESENT ILLNESS:  





This is a 61-year-old male with past medical history of CAD, A-fib on Xarelto, 

COPD, hypertension, and cardiomyopathy.  Patient follows in the office with Dr. Forrest.  We have been asked to see the patient in consultation for chest pain 

and elevated troponins.  Patient was examined at the bedside in the emergency 

room.  Patient states he has been having melanotic stools and diarrhea that 

started over Labor Day weekend.  PCP had ordered a CTAP which showed prominent 

appendix thickness with mild inflammatory changes concerning for appendicitis 

and was started on Cipro and Flagyl, which improved his symptoms but then they 

returned.  Patient had blood work done yesterday morning and the doctor called 

and told him to come in due to elevated troponins.  Patient denies chest pain, 

shortness of breath, fevers or chills at this time





DIAGNOSTICS:


- EKG


   #1: Nonspecific changes


   #2: ST changes inferior leads, tachycardia, ST elevation in aVR, ST 

depressions


   #3: Atrial flutter


- Chest xray shows no acute process


- Laboratory data: WBCs 11.4.  Troponin x 3: 0.06, 0.073, 0.089.


- Current home cardiac medications include Farxiga 10 mg daily, Xarelto 20 mg 

daily, atorvastatin 40 mg daily, Toprol XL 50 mg daily, aspirin 81 mg daily, 

losartan 100 mg daily, isosorbide mononitrate 30 mg daily, Lasix 40 mg daily as 

needed, amlodipine 10 mg daily


 Most recent echocardiogram September 9, 2024 showed LVEF estimated 40%, 

moderate LV systolic dysfunction with dilated left ventricle, dilated right 

ventricle





REVIEW OF SYSTEMS: 


At the time of my exam:


CONSTITUTIONAL: Denies fever or chills.


HEENT: Denies blurred vision, vision changes, or eye pain. Denies hemoptysis 


CARDIOVASCULAR: Denies chest pain. Denies orthopnea. Denies PND. Denies p

alpitations


RESPIRATORY: Endorses shortness of breath, but states improved.


GASTROINTESTINAL: Endorses abdominal pain. Denies nausea or vomiting. 


HEMATOLOGIC: Denies bleeding disorders.


GENITOURINARY:  Denies any blood in urine.


SKIN: Denies pruitis. Denies rash.





PHYSICAL EXAM: 


VITAL SIGNS: Reviewed.  Afebrile.  Normotensive. Tachycardic 130s, satting well 

on 2 L nasal cannula


GENERAL: Well-developed in no acute distress. 


HEENT: Head is normocephalic. Pupils are equal, round. Sclerae anicteric. Mucous

membranes of the mouth are moist. Neck supple. No JVD or thyromegaly


LUNGS: Respirations even and unlabored.  Some crackles bilaterally.


HEART: Irregular rate and rhythm.  S1 and S2 heard.


ABDOMEN: Soft, nondistended, tender to palpation periumbilical area and RLQ 

without rebound


EXTREMITIES: Normal range of motion.  No clubbing or cyanosis.  Peripheral 

pulses intact. 


NEUROLOGIC: Awake and alert. Oriented x 3. 





ASSESSMENT: 


Acute appendicitis


Troponin elevation: Flat


Atrial flutter


History of CAD 


History of A-fib


Hypertension


Hyperlipidemia


Tobacco use





PLAN: 


Surgery on board


Given IV amiodarone bolus then per protocol


Continue home cardiac meds except Xarelto which was held yesterday due to 

possible surgery


Patient is not currently on any anticoagulation, nurse instructed to start IV 

heparin if no plans for surgical intervention


Patient is cleared from cardiac standpoint for surgical intervention regarding 

appendicitis.





Further recommendations to follow based upon clinical course


Thank you kindly for this consultation.








Past Medical History


Past Medical History: Coronary Artery Disease (CAD), CVA/TIA, Hyperlipidemia, 

Hypertension, Myocardial Infarction (MI)


Additional Past Medical History / Comment(s): AUG 28, 14.


Last Myocardial Infarction Date:: UNKNOWN


History of Any Multi-Drug Resistant Organisms: None Reported


Past Surgical History: Tonsillectomy


Additional Past Surgical History / Comment(s): ventral hernia repair


Past Anesthesia/Blood Transfusion Reactions: No Reported Reaction


Past Psychological History: No Psychological Hx Reported


Smoking Status: Former smoker


Past Alcohol Use History: None Reported


Past Drug Use History: None Reported





- Past Family History


  ** Mother


Family Medical History: Deep Vein Thrombosis (DVT)





Medications and Allergies


                                Home Medications











 Medication  Instructions  Recorded  Confirmed  Type


 


Losartan Potassium 100 mg PO DAILY 09/30/14 09/09/24 History


 


Tamsulosin HCl 0.4 mg PO DAILY 05/01/15 09/09/24 History


 


Ubidecarenone [Co Q-10] 100 mg PO DAILY 05/01/15 09/09/24 History


 


Vitamin B Complex 1 cap PO DAILY 05/01/15 09/09/24 History


 


Glucosamine Sulfate 500 mg PO DAILY 09/13/18 09/09/24 History


 


Isosorbide Mononitrate [Isosorbide 30 mg PO DAILY 09/13/18 09/09/24 History





Mononitrate ER]    


 


Albuterol Nebulized [Ventolin 2.5 mg INHALATION RT-Q6H PRN 04/13/21 09/09/24 

History





Nebulized]    


 


Furosemide [Lasix] 40 mg PO DAILY PRN 04/13/21 09/09/24 History


 


amLODIPine [Norvasc] 10 mg PO DAILY 04/13/21 09/09/24 History


 


Aspirin 81 mg PO DAILY #90 chewable 04/14/21 09/09/24 Rx


 


Nitroglycerin Sl Tabs [Nitrostat] 0.4 mg SUBLINGUAL Q5M PRN #25 tab 04/14/21 09/09/24 Rx


 


Atorvastatin Calcium [Lipitor] 40 mg PO HS 09/09/24 09/09/24 History


 


Budesonide/Glycopyr/Formoterol 2 puff INHALATION RT-BID 09/09/24 09/09/24 

History





[Breztri Aerosphere Inhaler]    


 


Ciprofloxacin HCl [Cipro] 500 mg PO Q12HR 09/09/24 09/09/24 History


 


Dapagliflozin Propanediol [Farxiga] 10 mg PO DAILY 09/09/24 09/09/24 History


 


Latanoprost [Latanoprost 0.005%] 1 drop BOTH EYES HS 09/09/24 09/09/24 History


 


Metoprolol Succinate (ER) [Toprol 50 mg PO DAILY 09/09/24 09/09/24 History





Xl]    


 


Rivaroxaban [Xarelto] 20 mg PO DAILY 09/09/24 09/09/24 History


 


hydrOXYzine HCL [Atarax] 25 mg PO HS 09/09/24 09/09/24 History


 


metroNIDAZOLE [Flagyl] 500 mg PO TID 09/09/24 09/09/24 History








                                    Allergies











Allergy/AdvReac Type Severity Reaction Status Date / Time


 


naproxen sodium [From Aleve] Allergy  Rash/Hives Verified 09/09/24 15:18














Physical Exam


Vitals: 


                                   Vital Signs











  Temp Pulse Pulse Resp BP Pulse Ox


 


 09/10/24 10:39  97.9 F  129 H   17  105/88  96


 


 09/10/24 10:04   130 H    


 


 09/10/24 08:20  97.0 F L  135 H   22  112/82  94 L


 


 09/10/24 08:14   134 H    


 


 09/10/24 08:05       95


 


 09/10/24 08:03   134 H    


 


 09/10/24 06:10   137 H   18  97/72  93 L


 


 09/10/24 03:15   134 H   17  124/81 


 


 09/10/24 02:00   135 H   18  111/90  93 L


 


 09/10/24 01:00  97.5 F L  131 H   16  111/90  96


 


 09/10/24 00:10   87   18  112/90  93 L


 


 09/09/24 22:23   76    


 


 09/09/24 22:13   72    


 


 09/09/24 21:21   71   18   90 L


 


 09/09/24 19:37   70   18  139/93  91 L


 


 09/09/24 18:30   61   17  142/99  90 L


 


 09/09/24 16:00   70   17  129/90  92 L


 


 09/09/24 14:46   65   16  124/87  93 L


 


 09/09/24 12:50    59 L   


 


 09/09/24 12:37  97.4 F L  76   16  130/90  91 L








                                Intake and Output











 09/09/24 09/10/24 09/10/24





 22:59 06:59 14:59


 


Intake Total  5.5 


 


Balance  5.5 


 


Intake:   


 


  Intake, IV Titration  5.5 





  Amount   


 


    Diltiazem 125 mg In  5.5 





    Sodium Chloride 0.9% 100   





    ml @ 5 MG/HR 5 mls/hr IV   





    .Q24H [facility] Rx#:[id number]   














Results





                                 09/10/24 04:51





                                 09/10/24 04:51


                                 Cardiac Enzymes











  09/09/24 09/09/24 09/09/24 Range/Units





  13:26 13:26 16:08 


 


AST  35    (17-59)  U/L


 


Troponin I   0.076 H*  0.073 H*  (0.000-0.034)  ng/mL














  09/09/24 09/10/24 Range/Units





  19:12 04:51 


 


AST   29  (17-59)  U/L


 


Troponin I  0.089 H*   (0.000-0.034)  ng/mL








                                   Coagulation











  09/09/24 Range/Units





  13:26 


 


PT  12.4  (10.0-12.5)  sec


 


APTT  29.9  (22.0-30.0)  sec








                                       CBC











  09/09/24 09/10/24 Range/Units





  13:26 04:51 


 


WBC  11.2 H  11.4 H  (3.8-10.6)  k/uL


 


RBC  4.72  5.08  (4.30-5.90)  m/uL


 


Hgb  14.5  15.6  (13.0-17.5)  gm/dL


 


Hct  43.1  46.7  (39.0-53.0)  %


 


Plt Count  352  356  (150-450)  k/uL








                          Comprehensive Metabolic Panel











  09/09/24 09/10/24 Range/Units





  13:26 04:51 


 


Sodium  134 L  137  (137-145)  mmol/L


 


Potassium  4.0  3.8  (3.5-5.1)  mmol/L


 


Chloride  99  101  ()  mmol/L


 


Carbon Dioxide  29  28  (22-30)  mmol/L


 


BUN  10  6 L  (9-20)  mg/dL


 


Creatinine  0.73  0.66  (0.66-1.25)  mg/dL


 


Glucose  101 H  86  (74-99)  mg/dL


 


Calcium  8.9  8.7  (8.4-10.2)  mg/dL


 


AST  35  29  (17-59)  U/L


 


ALT  27  25  (4-49)  U/L


 


Alkaline Phosphatase  78  82  ()  U/L


 


Total Protein  6.1 L  5.7 L  (6.3-8.2)  g/dL


 


Albumin  3.6  3.4 L  (3.5-5.0)  g/dL








                               Current Medications











Generic Name Dose Route Start Last Admin





  Trade Name Freq  PRN Reason Stop Dose Admin


 


Albuterol Sulfate  2.5 mg  09/09/24 16:14  09/10/24 08:02





  Albuterol Nebulized 2.5 Mg/3 Ml  INHALATION   2.5 mg





  RT-Q6H PRN   Administration





  Shortness Of Breath  


 


Amlodipine Besylate  10 mg  09/10/24 09:00  09/10/24 10:06





  Amlodipine 10 Mg Tab  PO   Not Given





  DAILY TAMAR  


 


Atorvastatin Calcium  40 mg  09/09/24 21:00  09/09/24 21:17





  Atorvastatin 40 Mg Tab  PO   40 mg





  HS TAMAR   Administration


 


Dapagliflozin  10 mg  09/10/24 09:00  09/10/24 10:06





  Dapagliflozin Propanediol 10 Mg Tablet  PO   Not Given





  DAILY TAMAR  


 


Furosemide  40 mg  09/09/24 16:14 





  Furosemide 40 Mg Tab  PO  





  DAILY PRN  





  Edema  


 


Hydromorphone HCl  0.5 mg  09/09/24 15:21 





  Hydromorphone 0.5 Mg/0.5 Ml Syringe  IVP  





  Q3HR PRN  





  Moderate Pain (Scale 4 to 6)  


 


Piperacillin Sod/Tazobactam  100 mls @ 25 mls/hr  09/09/24 16:00  09/10/24 08:22





  Sod 3.375 gm/ Sodium Chloride  IVPB   25 mls/hr





  Q8HR TAMAR   Administration





  Protocol  


 


Diltiazem HCl 125 mg/ Sodium  125 mls @ 5 mls/hr  09/10/24 02:00  09/10/24 03:14





  Chloride  IV   10 mg/hr





  .Q24H TAMAR   10 mls/hr





    Infusion





  5 MG/HR  


 


Amiodarone HCl 360 mg/  200 mls @ 33.333 mls/hr  09/10/24 08:17  09/10/24 09:30





  Dextrose/Water  IV  09/10/24 14:16  1 mg/min





  .Q6H ONE   33.333 mls/hr





    Administration





  Protocol  





  1 MG/MIN  


 


Amiodarone HCl 450 mg/  250 mls @ 16.667 mls/hr  09/10/24 14:30 





  Dextrose/Water  IV  09/11/24 08:29 





  .Q15H TAMAR  





  Protocol  





  0.5 MG/MIN  


 


Isosorbide Mononitrate  30 mg  09/10/24 09:00  09/10/24 10:06





  Isosorbide Mononitrate Er 30 Mg Tab.Er.24h  PO   Not Given





  DAILY TAMAR  


 


Latanoprost  1 drops  09/09/24 21:00  09/09/24 22:21





  Latanoprost 0.005% Ophth Drops 2.5 Ml Btl  BOTH EYES   1 drops





  HS TAMAR   Administration


 


Metoprolol Succinate  50 mg  09/10/24 09:00  09/10/24 10:07





  Metoprolol Succinate (Er) 50 Mg Tab.Er.24h  PO   Not Given





  DAILY TAMAR  


 


Naloxone HCl  0.2 mg  09/09/24 15:21 





  Naloxone 0.4 Mg/Ml 1 Ml Vial  IV  





  Q2M PRN  





  Opioid Reversal  


 


Pantoprazole Sodium  40 mg  09/09/24 21:00  09/10/24 08:36





  Pantoprazole 40 Mg/10 Ml Vial  IVP   40 mg





  BID TAMAR   Administration


 


Tamsulosin HCl  0.4 mg  09/10/24 09:00  09/10/24 10:07





  Tamsulosin 0.4 Mg Cap.Er.24h  PO   Not Given





  DAILY TAMAR  








                                Intake and Output











 09/09/24 09/10/24 09/10/24





 22:59 06:59 14:59


 


Intake Total  5.5 


 


Balance  5.5 


 


Intake:   


 


  Intake, IV Titration  5.5 





  Amount   


 


    Diltiazem 125 mg In  5.5 





    Sodium Chloride 0.9% 100   





    ml @ 5 MG/HR 5 mls/hr IV   





    .Q24H [facility] Rx#:[id number]   








                                        





                                 09/10/24 04:51 





                                 09/10/24 04:51

## 2024-09-10 NOTE — CA
Transthoracic Echo Report 

 Name: Floyd Lewis 

 MRN:    [id number] 

 Age:    69     Gender:     M 

 

 :    1955 

 Exam Date:     2024 18:23 

 Exam Location: [facility] 

 Ht (in):     64     Wt (lb):     170 

 Ordering Physician:        Stephane Gallagher MD 

 Attending/Referring Phys: 

 Technician         Shirley Arndt RDCS 

 Procedure CPT: 

 Indications:       trop 

 

 Cardiac Hx: 

 Technical Quality:      Technically difficult study 

 Contrast 1:    Definity                    Total Dose (mL):      2 

 Contrast 2:                                Total Dose (mL): 

 

 MEASUREMENTS  (Male / Female) Normal Values 

 2D ECHO 

 LV Diastolic Diameter PLAX        5.8 cm                4.2 - 5.9 / 3.9 - 5.3 cm 

 LV Systolic Diameter PLAX         4.6 cm                 

 IVS Diastolic Thickness           0.8 cm                0.6 - 1.0 / 0.6 - 0.9 cm 

 LVPW Diastolic Thickness          0.9 cm                0.6 - 1.0 / 0.6 - 0.9 cm 

 LV Relative Wall Thickness        0.3                    

 LVOT Diameter                     2.2 cm                 

 LV Diastolic Volume MOD BP        162.8 cm???             67 - 155 / 56 - 104 cm??? 

 LV Systolic Volume MOD BP         93.0 cm???              22 - 58 / 19 - 49 cm??? 

 LV Ejection Fraction MOD BP       42.9 %                >= 55  % 

 LV Cardiac Index MOD BP           2587.9 cm???/min???m???      

 LV Diastolic Volume MOD 4C        168.4 cm???              

 LV Systolic Volume MOD 4C         92.4 cm???               

 LV Ejection Fraction MOD 4C       45.1 %                 

 LV Cardiac Index MOD 4C           2814.8 cm???/min???m???      

 LV Diastolic Length 4C            8.2 cm                 

 LV Systolic Length 4C             7.2 cm                 

 LV Diastolic Volume MOD 2C        157.1 cm???              

 LV Systolic Volume MOD 2C         91.8 cm???               

 LV Ejection Fraction MOD 2C       41.6 %                 

 LV Cardiac Index MOD 2C           2419.5 cm???/min???m???      

 LV Diastolic Length 2C            8.2 cm                 

 LV Systolic Length 2C             7.4 cm                 

 LA Volume                         73.6 cm???              18 - 58 / 22 - 52 cm??? 

 LA Volume Index                   38.9 cm???/m???           16 - 28 cm???/m??? 

 

 DOPPLER 

 AV Peak Velocity                  136.5 cm/s             

 AV Peak Gradient                  7.5 mmHg               

 AV Mean Velocity                  97.6 cm/s              

 AV Mean Gradient                  4.2 mmHg               

 AV Velocity Time Integral         28.2 cm                

 LVOT Peak Velocity                88.1 cm/s              

 LVOT Peak Gradient                3.1 mmHg               

 LVOT Velocity Time Integral       18.9 cm                

 LVOT Stroke Volume                74.8 cm???               

 LVOT Stroke Volume Index          41.0 ml/m???             

 LVOT Cardiac Index                2771.5 cm???/min???m???      

 AV Area Cont Eq vti               2.7 cm???                

 AV Area Cont Eq pk                2.6 cm???                

 MV Area PHT                       3.8 cm???                

 Mitral E Point Velocity           48.3 cm/s              

 Mitral A Point Velocity           59.9 cm/s              

 Mitral E to A Ratio               0.8                    

 MV Deceleration Time              198.6 ms               

 

 

 FINDINGS 

 Left Ventricle 

 Left ventricular ejection fraction is estimated at -40 %.  Mildly increased  

 left ventricular diastolic volume. . Mildly decreased left ventricular ejection  

 fraction. 

 

 Right Ventricle 

 Moderate to severe ventricular dilatation with mildly reduced function. Unable  

 to estimate the right ventricular systolic pressure. 

 

 Right Atrium 

 Severe right atrial dilatation. 

 

 Left Atrium 

 Moderately increased left atrial volume. 

 

 Mitral Valve 

 Structurally normal mitral valve. No mitral stenosis, regurgitation or  

 prolapse. 

 

 Aortic Valve 

 Trileaflet aortic valve. No aortic stenosis. Trace aortic regurgitation. 

 

 Tricuspid Valve 

 Structurally normal tricuspid valve. No tricuspid stenosis. No tricuspid  

 regurgitation. 

 

 Pulmonic Valve 

 Pulmonic valve not well visualized. No pulmonic stenosis. No pulmonic  

 regurgitation. 

 

 Pericardium 

 No pericardial effusion. 

 

 Aorta 

 Aortic annulus normal. 

 

 CONCLUSIONS 

 Moderate LV systolic dysfunction with a dilated left ventricle 

 Dilated right ventricle 

 Technically suboptimal study 

 Previewed by:  

 Dr. Sebas Plasencia MD 

 (Electronically Signed) 

 Final Date:      10 September 2024 10:32

## 2024-09-10 NOTE — CT
EXAMINATION TYPE: CT abdomen pelvis w con

CT DLP: 838.7 mGycm, Automated exposure control for dose reduction was used.

 

DATE OF EXAM: 9/10/2024 1:14 PM

 

COMPARISON: 9/6/2024 

 

CLINICAL INDICATION: Male, 69 years old with history of lower abdominal pain; rlq pain

 

TECHNIQUE:  Axial CT abdomen pelvis w con;Sagittal and coronal reformats were created on a separate w
orkstation. 

 

Contrast used:100 ml mL of Isovue 300 with IV Contrast, (none if empty)

Oral contrast used: without Oral Contrast (none if empty)

 

FINDINGS: 

LOWER CHEST: Unremarkable

 

ABDOMEN

LIVER: Unremarkable

GALLBLADDER AND BILE DUCTS: Unremarkable.

PANCREAS: Unremarkable.

SPLEEN: Unremarkable.

ADRENAL GLANDS: Unremarkable.

KIDNEYS AND URETERS: No evidence of hydronephrosis or renal calculus. The ureters are unremarkable. P
robable right renal cyst. PELVIS

BLADDER: Unremarkable

REPRODUCTIVE: Unremarkable.

 

ABDOMEN & PELVIS

STOMACH AND BOWEL: No evidence of bowel obstruction. Scattered colonic diverticula. The appendix agai
n is visualized and has mild fat stranding changes around it measures at the upper limits of normal s
lightly enlarged for thickness at 9 mm. No organizing fluid collection. There are multiple stones in 
the right lower quadrant also present.

PERITONEUM/RETROPERITONEUM: No evidence of pneumoperitoneum or free fluid.

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 Se
tricia atherosclerosis of the arterial vasculature. Dilation of the infrarenal abdominal aorta measurin
g up to 4.5 cm.

MUSCULOSKELETAL: No acute osseous abnormalities

LYMPH NODES: No gross evidence for lymphadenopathy.

SOFT TISSUE/ABDOMINAL WALL: Unremarkable

 

IMPRESSION:

1.  The appendix remains prominent with mild inflammation changes and mild dilation.

2.  Colonic diverticulosis. No definitive evidence for diverticulitis. 

3.  No obstructive uropathy or renal calculus.

4.  Infrarenal abdominal aortic aneurysm measuring up to 4.5 cm. 

5.  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

## 2024-09-10 NOTE — P.PN
Subjective


Progress Note Date: 09/10/24





Subjective: 


Patient seen and examined at the bedside.  No acute events overnight.  Patient 

is complaining of mild periumbilical pain.  Denies nausea, vomiting, fever, 

chills, shortness of breath, chest pain.





All Systems reviewed and pertinent positives and negatives noted in HPI, all 

other symptoms are negative





Objective:





Vital signs reviewed. 





General: non toxic, no distress, appears at stated age, overweight


Derm: no unusual rashes/lesions, warm


Head: atraumatic, normocephalic, symmetric


Eyes: EOMI, no lid lag, anicteric sclera, pupils equal round reactive to light


ENT: Nose and ears atraumatic


Neck: No cervical lymphadenopathy, trachea midline, supple


Mouth: no lip lesion, mucus membranes moist


Cardiovascular: S1S2 irregular, no murmur, positive dorsalis pedis pulse 

bilateral, no edema


Lungs: Decreased breath sounds bilaterally, no rhonchi, no rales, no accessory 

muscle use


Abdominal: soft,  nontender to palpation, no guarding


Ext: muscle strength 5 out of 5 in all 4 extremities grossly, no gross muscle 

atrophy, no contractures,


Neuro:  CN II-XI grossly intact, no gross focal neuro deficits


Psych: Alert, oriented, appropriate affect





Data reviewed today: 





Labs: WBC 11.4, hemoglobin 15.6, MCV 91.8, platelet count 356, sodium 137, 

potassium 3.8, BUN 6, creatinine 0.66.


Images: CT abdomen and pelvis shows mildly inflamed appendix, colonic 

diverticulosis, infrarenal abdominal aortic aneurysm measuring up to 4.5 cm and 

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 

mm.


Echocardiogram shows LVEF at 40%.  Moderate LV systolic dysfunction with a 

dilated left ventricle.





Assessment and Plan: 


61-year-old male with PMH of CAD, A-fib on Xarelto, COPD, BPH, HTN presents to 

the ER for abnormal lab work.  Patient had an outpatient CT abdomen and pelvis 

on 9/4/2024 which showed prominent appendix thickness with mild inflammatory 

changes correlating for appendicitis, diverticulosis and saccular aneurysm 

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  His outpatient 

troponin I was also elevated.  Patient was admitted for appendicitis and 

elevated troponin likely ACS.





#Acute appendicitis


As seen on CT abdomen pelvis. Mild leukocytosis.


Continue with Zosyn 3.375g IV TID. Does not meet sepsis criteria.


Surgery consulted; no surgical intervention at this time, patient to continue on

IV antibiotics with transition to oral antibiotics 





#Melena


Obtain stool occult blood. C-scope 2015 sigmoid diverticulosis. Protonix 40 mg 

IV BID. Repeat CBC tomorrow morning.





#A-fib with RVR


#Troponin elevation, ACS unlikely


Troponin I trended flat at 0.076, 0.073, 0.089


Echocardiogram shows LVEF at 40%.  Moderate LV systolic dysfunction with a 

dilated left ventricle.


Telemetry monitoring.


Cardiology consulted; started on IV amiodarone and continue with metoprolol 50 

mg p.o. daily


Low-dose heparin drip





#Fatigue


Possibly related to above. 


TSH 1.54, hemoglobin 15.4





Chronic conditions:


CAD: Echo 2021 EF 50-55% with moderate concentric LVH, basal inferior LV 

hypokinesis. Hold ASA. Metoprolol 50 mg PO QD. Lipitor 40 mg PO QHS.  Farxiga 10

mg p.o. daily


COPD: Albuterol neb Q6H PRN SOB/wheezing. No in acute exacerbation.


BPH: Flomax 0.4 mg PO QD.


HTN: Imdur 30 mg PO QD. Amlodipine 10 mg PO QD. Metoprolol as above.





F: None


E: Replete as needed


N: N.p.o.


A: Ambulatory





DVT ppx: Low-dose heparin drip


GI prophylaxis: IV Protonix 40 mg twice daily


Code Status: Full code





Anticipated discharge place: Pending clinical course


Anticipated discharge date: Pending clinical course





I have seen and evaluated the patient today.  Discussed with the resident and 

agree with the residents finding and plan as documented in the resident's note. 





Objective





- Vital Signs


Vital signs: 


                                   Vital Signs











Temp  97.9 F   09/10/24 10:39


 


Pulse  112 H  09/10/24 15:07


 


Resp  22   09/10/24 15:07


 


BP  94/83   09/10/24 15:07


 


Pulse Ox  97   09/10/24 15:07


 


FiO2      








                                 Intake & Output











 09/09/24 09/10/24 09/10/24





 18:59 06:59 18:59


 


Intake Total  5.5 119.5


 


Output Total   700


 


Balance  5.5 -580.5


 


Weight 77.111 kg  


 


Intake:   


 


  Intake, IV Titration  5.5 119.5





  Amount   


 


    Diltiazem 125 mg In  5.5 119.5





    Sodium Chloride 0.9% 100   





    ml @ 5 MG/HR 5 mls/hr IV   





    .Q24H [facility] Rx#:[id number]   


 


Output:   


 


  Urine   700














- Labs


CBC & Chem 7: 


                                 09/10/24 04:51





                                 09/10/24 04:51


Labs: 


                  Abnormal Lab Results - Last 24 Hours (Table)











  09/09/24 09/10/24 09/10/24 Range/Units





  19:12 04:51 04:51 


 


WBC   11.4 H   (3.8-10.6)  k/uL


 


Neutrophils #   9.2 H   (1.3-7.7)  k/uL


 


BUN    6 L  (9-20)  mg/dL


 


Troponin I  0.089 H*    (0.000-0.034)  ng/mL


 


Total Protein    5.7 L  (6.3-8.2)  g/dL


 


Albumin    3.4 L  (3.5-5.0)  g/dL

## 2024-09-10 NOTE — P.GSCN
History of Present Illness


Consult date: 09/10/24


History of present illness: 





69-year-old male presented to the emergency department after lab work showed 

increased troponin level.  He does state that over the past week since Labor Day

weekend he has had dark-colored stool along with loose stool.  He states he had 

minimal abdominal pain.  He was sent as an outpatient for a CT scan of the 

abdomen and pelvis on 9/6/2024 with finding of dilated appendix with 

inflammatory surrounding changes and to correlate for appendicitis.  He states 

that he was placed on oral antibiotics and has been taking them since that time.

 He currently denies any abdominal pain.  He denies any nausea or vomiting.  He 

states that his bowel function is somewhat improved.  Is somewhat improved.  

Denies nausea or vomiting.  No other complaints at this time.  He is on 

anticoagulation.





Review of Systems


All systems: negative





Past Medical History


Past Medical History: Coronary Artery Disease (CAD), CVA/TIA, Hyperlipidemia, 

Hypertension, Myocardial Infarction (MI)


Additional Past Medical History / Comment(s): AUG 28, 14.


Last Myocardial Infarction Date:: UNKNOWN


History of Any Multi-Drug Resistant Organisms: None Reported


Past Surgical History: Tonsillectomy


Additional Past Surgical History / Comment(s): ventral hernia repair


Past Anesthesia/Blood Transfusion Reactions: No Reported Reaction


Past Psychological History: No Psychological Hx Reported


Smoking Status: Former smoker


Past Alcohol Use History: None Reported


Past Drug Use History: None Reported





- Past Family History


  ** Mother


Family Medical History: Deep Vein Thrombosis (DVT)





Medications and Allergies


                                Home Medications











 Medication  Instructions  Recorded  Confirmed  Type


 


Losartan Potassium 100 mg PO DAILY 09/30/14 09/09/24 History


 


Tamsulosin HCl 0.4 mg PO DAILY 05/01/15 09/09/24 History


 


Ubidecarenone [Co Q-10] 100 mg PO DAILY 05/01/15 09/09/24 History


 


Vitamin B Complex 1 cap PO DAILY 05/01/15 09/09/24 History


 


Glucosamine Sulfate 500 mg PO DAILY 09/13/18 09/09/24 History


 


Isosorbide Mononitrate [Isosorbide 30 mg PO DAILY 09/13/18 09/09/24 History





Mononitrate ER]    


 


Albuterol Nebulized [Ventolin 2.5 mg INHALATION RT-Q6H PRN 04/13/21 09/09/24 

History





Nebulized]    


 


Furosemide [Lasix] 40 mg PO DAILY PRN 04/13/21 09/09/24 History


 


amLODIPine [Norvasc] 10 mg PO DAILY 04/13/21 09/09/24 History


 


Aspirin 81 mg PO DAILY #90 chewable 04/14/21 09/09/24 Rx


 


Nitroglycerin Sl Tabs [Nitrostat] 0.4 mg SUBLINGUAL Q5M PRN #25 tab 04/14/21 09/09/24 Rx


 


Atorvastatin Calcium [Lipitor] 40 mg PO HS 09/09/24 09/09/24 History


 


Budesonide/Glycopyr/Formoterol 2 puff INHALATION RT-BID 09/09/24 09/09/24 

History





[Breztri Aerosphere Inhaler]    


 


Ciprofloxacin HCl [Cipro] 500 mg PO Q12HR 09/09/24 09/09/24 History


 


Dapagliflozin Propanediol [Farxiga] 10 mg PO DAILY 09/09/24 09/09/24 History


 


Latanoprost [Latanoprost 0.005%] 1 drop BOTH EYES HS 09/09/24 09/09/24 History


 


Metoprolol Succinate (ER) [Toprol 50 mg PO DAILY 09/09/24 09/09/24 History





Xl]    


 


Rivaroxaban [Xarelto] 20 mg PO DAILY 09/09/24 09/09/24 History


 


hydrOXYzine HCL [Atarax] 25 mg PO HS 09/09/24 09/09/24 History


 


metroNIDAZOLE [Flagyl] 500 mg PO TID 09/09/24 09/09/24 History








                                    Allergies











Allergy/AdvReac Type Severity Reaction Status Date / Time


 


naproxen sodium [From Aleve] Allergy  Rash/Hives Verified 09/09/24 15:18














Surgical - Exam


Osteopathic Statement: *.  No significant issues noted on an osteopathic 

structural exam other than those noted in the History and Physical/Consult.


                                   Vital Signs











Temp Pulse Resp BP Pulse Ox


 


 97.4 F L  76   16   130/90   91 L


 


 09/09/24 12:37  09/09/24 12:37  09/09/24 12:37  09/09/24 12:37  09/09/24 12:37














- General


well nourished, no distress





- Eyes


normal ocular movement





- ENT


no hearing loss





- Neck


trachea midline





- Respiratory


normal respiratory effort





- Abdomen





Soft, nontender, nondistended, no rebound, no guarding.





- Neurologic


normal coordination, normal sensation





- Psychiatric


oriented to time, oriented to person, oriented to place





Results





- Labs





                                 09/10/24 04:51





                                 09/10/24 04:51


                  Abnormal Lab Results - Last 24 Hours (Table)











  09/09/24 09/09/24 09/10/24 Range/Units





  16:08 19:12 04:51 


 


WBC    11.4 H  (3.8-10.6)  k/uL


 


Neutrophils #    9.2 H  (1.3-7.7)  k/uL


 


BUN     (9-20)  mg/dL


 


Troponin I  0.073 H*  0.089 H*   (0.000-0.034)  ng/mL


 


Total Protein     (6.3-8.2)  g/dL


 


Albumin     (3.5-5.0)  g/dL














  09/10/24 Range/Units





  04:51 


 


WBC   (3.8-10.6)  k/uL


 


Neutrophils #   (1.3-7.7)  k/uL


 


BUN  6 L  (9-20)  mg/dL


 


Troponin I   (0.000-0.034)  ng/mL


 


Total Protein  5.7 L  (6.3-8.2)  g/dL


 


Albumin  3.4 L  (3.5-5.0)  g/dL








                                 Diabetes panel











  09/10/24 Range/Units





  04:51 


 


Sodium  137  (137-145)  mmol/L


 


Potassium  3.8  (3.5-5.1)  mmol/L


 


Chloride  101  ()  mmol/L


 


Carbon Dioxide  28  (22-30)  mmol/L


 


BUN  6 L  (9-20)  mg/dL


 


Creatinine  0.66  (0.66-1.25)  mg/dL


 


Glucose  86  (74-99)  mg/dL


 


Calcium  8.7  (8.4-10.2)  mg/dL


 


AST  29  (17-59)  U/L


 


ALT  25  (4-49)  U/L


 


Alkaline Phosphatase  82  ()  U/L


 


Total Protein  5.7 L  (6.3-8.2)  g/dL


 


Albumin  3.4 L  (3.5-5.0)  g/dL








                                  Thyroid panel











  09/10/24 Range/Units





  04:51 


 


TSH  1.540  (0.465-4.680)  mIU/L








                                  Calcium panel











  09/10/24 Range/Units





  04:51 


 


Calcium  8.7  (8.4-10.2)  mg/dL


 


Albumin  3.4 L  (3.5-5.0)  g/dL








                                 Pituitary panel











  09/10/24 Range/Units





  04:51 


 


Sodium  137  (137-145)  mmol/L


 


Potassium  3.8  (3.5-5.1)  mmol/L


 


Chloride  101  ()  mmol/L


 


Carbon Dioxide  28  (22-30)  mmol/L


 


BUN  6 L  (9-20)  mg/dL


 


Creatinine  0.66  (0.66-1.25)  mg/dL


 


Glucose  86  (74-99)  mg/dL


 


Calcium  8.7  (8.4-10.2)  mg/dL


 


TSH  1.540  (0.465-4.680)  mIU/L








                                  Adrenal panel











  09/10/24 Range/Units





  04:51 


 


Sodium  137  (137-145)  mmol/L


 


Potassium  3.8  (3.5-5.1)  mmol/L


 


Chloride  101  ()  mmol/L


 


Carbon Dioxide  28  (22-30)  mmol/L


 


BUN  6 L  (9-20)  mg/dL


 


Creatinine  0.66  (0.66-1.25)  mg/dL


 


Glucose  86  (74-99)  mg/dL


 


Calcium  8.7  (8.4-10.2)  mg/dL


 


Total Bilirubin  0.8  (0.2-1.3)  mg/dL


 


AST  29  (17-59)  U/L


 


ALT  25  (4-49)  U/L


 


Alkaline Phosphatase  82  ()  U/L


 


Total Protein  5.7 L  (6.3-8.2)  g/dL


 


Albumin  3.4 L  (3.5-5.0)  g/dL














Assessment and Plan


Plan: 





69-year-old male with concern for appendicitis that was diagnosed on CT on 

9/6/2024.  Since that time, he has been on oral antibiotics and states that he 

has no abdominal pain.  Currently, his chief complaint is elevated troponin 

level.  Cardiology is evaluating the patient.  I did order repeat CT of the 

abdomen and pelvis with similar finding from previous CT 4 days ago with dilated

appendix and minimal fat stranding around the appendix.  He has been on IV 

antibiotics since his admission.  I discussed the case in depth with the patient

and the patient's wife.  As he is not having any significant symptoms of 

appendicitis and since the appendicitis has been diagnosed via imaging 4 days 

ago without any progression since, it is reasonable to continue antibiotics as 

the patient is not eager to have surgical intervention without any symptoms.  We

will continue antibiotics at this time and have further discussions based on his

clinical progress.

## 2024-09-11 LAB
BASOPHILS # BLD AUTO: 0.1 K/UL (ref 0–0.2)
BASOPHILS NFR BLD AUTO: 1 %
EOSINOPHIL # BLD AUTO: 0.2 K/UL (ref 0–0.7)
EOSINOPHIL NFR BLD AUTO: 2 %
ERYTHROCYTE [DISTWIDTH] IN BLOOD BY AUTOMATED COUNT: 5.24 M/UL (ref 4.3–5.9)
ERYTHROCYTE [DISTWIDTH] IN BLOOD: 13.2 % (ref 11.5–15.5)
HCT VFR BLD AUTO: 49.5 % (ref 39–53)
HGB BLD-MCNC: 15.8 GM/DL (ref 13–17.5)
INR PPP: 1.1 (ref ?–1.2)
LYMPHOCYTES # SPEC AUTO: 1 K/UL (ref 1–4.8)
LYMPHOCYTES NFR SPEC AUTO: 8 %
MCH RBC QN AUTO: 30.2 PG (ref 25–35)
MCHC RBC AUTO-ENTMCNC: 31.9 G/DL (ref 31–37)
MCV RBC AUTO: 94.4 FL (ref 80–100)
MONOCYTES # BLD AUTO: 0.8 K/UL (ref 0–1)
MONOCYTES NFR BLD AUTO: 7 %
NEUTROPHILS # BLD AUTO: 9.9 K/UL (ref 1.3–7.7)
NEUTROPHILS NFR BLD AUTO: 81 %
PLATELET # BLD AUTO: 442 K/UL (ref 150–450)
PT BLD: 11.8 SEC (ref 10–12.5)
WBC # BLD AUTO: 12.1 K/UL (ref 3.8–10.6)

## 2024-09-11 RX ADMIN — AMIODARONE HYDROCHLORIDE SCH MG: 200 TABLET ORAL at 09:04

## 2024-09-11 RX ADMIN — FUROSEMIDE SCH MG: 10 INJECTION, SOLUTION INTRAMUSCULAR; INTRAVENOUS at 14:39

## 2024-09-11 NOTE — P.PN
Subjective


Progress Note Date: 09/11/24





Patient seen and examined at bedside.  Continues to deny abdominal pain.  States

he is hungry and would like advancement of diet.  He was started on heparin 

drip.





Objective





- Vital Signs


Vital signs: 


                                   Vital Signs











Temp  97.9 F   09/10/24 10:39


 


Pulse  110 H  09/11/24 02:00


 


Resp  18   09/11/24 02:00


 


BP  116/72   09/11/24 02:00


 


Pulse Ox  92 L  09/11/24 02:00


 


FiO2      








                                 Intake & Output











 09/10/24 09/11/24 09/11/24





 18:59 06:59 18:59


 


Intake Total 599.5 552 


 


Output Total 700 700 


 


Balance -100.5 -148 


 


Weight 77.111 kg 77.6 kg 


 


Intake:   


 


  Intake, IV Titration 119.5 312 





  Amount   


 


    Amiodarone 450 mg In  140 





    Dextrose 5% in Water 250   





    ml @ 0.5 MG/MIN 16.667   





    mls/hr IV .Q15H [facility] Rx#:   





    [id number]   


 


    Diltiazem 125 mg In 119.5  





    Sodium Chloride 0.9% 100   





    ml @ 5 MG/HR 5 mls/hr IV   





    .Q24H [facility] Rx#:[id number]   


 


    Heparin Sod,Pork in 0.45%  72 





    NaCl 25,000 unit In 0.45   





    % NaCl 1 250ml.bag @ 12   





    UNITS/KG/HR 9.253 mls/hr   





    IV .Q24H [facility] Rx#:   





    [id number]   


 


    Piperacillin-Tazobactam 3  100 





    .375 gm In Sodium   





    Chloride 0.9% 100 ml @ 25   





    mls/hr IVPB Q8HR [facility] Rx#   





    :[id number]   


 


  Oral 480 240 


 


Output:   


 


  Urine 700 700 














- Constitutional


General appearance: Present: cooperative, no acute distress





- Respiratory


Details: 





No difficulty with respiration





- Gastrointestinal


Gastrointestinal Comment(s): 





Soft, nontender, nondistended, no rebound, no guarding





- Psychiatric


Psychiatric: Present: A&O x's 3





- Labs


CBC & Chem 7: 


                                 09/10/24 04:51





                                 09/10/24 04:51


Labs: 


                  Abnormal Lab Results - Last 24 Hours (Table)











  09/10/24 Range/Units





  22:11 


 


APTT  50.3 H  (22.0-30.0)  sec














Assessment and Plan


Plan: 





69-year-old male with concern for possible appendicitis.  Continues to be 

asymptomatic.  Denies abdominal pain.  No abdominal pain on exam.  Appendicitis 

may have been diagnosed as early appendicitis and as patient was started on 

antibiotics early, he may be resolving as based on CODA trial.  I discussed 

surgical option again today and as he continues to have no symptoms, he would 

like to continue with antibiotic therapy rather than surgical intervention.  He 

is asking for advancement of diet and we will advance his diet today.  He is on 

anticoagulation with heparin drip at this time per cardiology.

## 2024-09-11 NOTE — P.PN
Subjective


Progress Note Date: 09/11/24





Subjective: 


Patient seen and examined at the bedside.  No acute events overnight.  Patient 

denies abdominal pain.  Patient states that he has melanotic bowel movement 

overnight.  No bright red blood per rectum.





All Systems reviewed and pertinent positives and negatives noted in HPI, all 

other symptoms are negative





Objective:





Vital signs reviewed. 





General: non toxic, no distress, appears at stated age, overweight


Derm: no unusual rashes/lesions, warm


Head: atraumatic, normocephalic, symmetric


Eyes: EOMI, no lid lag, anicteric sclera, pupils equal round reactive to light


ENT: Nose and ears atraumatic


Neck: No cervical lymphadenopathy, trachea midline, supple


Mouth: no lip lesion, mucus membranes moist


Cardiovascular: S1S2 irregular, no murmur, positive dorsalis pedis pulse 

bilateral, no edema


Lungs: Decreased breath sounds bilaterally, no rhonchi, no rales, no accessory 

muscle use


Abdominal: soft,  nontender to palpation, no guarding


Ext: muscle strength 5 out of 5 in all 4 extremities grossly, no gross muscle 

atrophy, no contractures,


Neuro:  CN II-XI grossly intact, no gross focal neuro deficits


Psych: Alert, oriented, appropriate affect





Data reviewed today: 





Labs: WBC 12.1, hemoglobin 15.8, neutrophil count 9.9, ABG 36.7


Images: CT abdomen and pelvis shows mildly inflamed appendix, colonic 

diverticulosis, infrarenal abdominal aortic aneurysm measuring up to 4.5 cm and 

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 

mm.


Echocardiogram shows LVEF at 40%.  Moderate LV systolic dysfunction with a 

dilated left ventricle.





Assessment and Plan: 


61-year-old male with PMH of CAD, A-fib on Xarelto, COPD, BPH, HTN presents to 

the ER for abnormal lab work.  Patient had an outpatient CT abdomen and pelvis 

on 9/4/2024 which showed prominent appendix thickness with mild inflammatory 

changes correlating for appendicitis, diverticulosis and saccular aneurysm 

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  His outpatient 

troponin I was also elevated.  Patient was admitted for appendicitis and 

elevated troponin likely ACS.  Surgery consulted.  No surgical intervention at 

this time patient to be managed medically.  Patient on IV antibiotics.





#Acute appendicitis


As seen on CT abdomen pelvis. Mild leukocytosis.


Continue with Zosyn 3.375g IV TID. 


Surgery consulted; no surgical intervention at this time, patient to continue on

IV antibiotics with transition to oral antibiotics 


Low fiber diet





#Melena


Obtain stool occult blood. C-scope 2015 sigmoid diverticulosis. Protonix 40 mg 

IV BID. Repeat CBC tomorrow morning.





#A-fib with RVR


#Troponin elevation, ACS unlikely


Troponin I trended flat at 0.076, 0.073, 0.089


Echocardiogram shows LVEF at 40%.  Moderate LV systolic dysfunction with a 

dilated left ventricle.


Telemetry monitoring.


Cardiology consulted; rate control managed with IV Cardizem, IV amiodarone and 

metoprolol 50 mg p.o. daily 


Low-dose heparin drip for anticoagulation





#Fatigue


Possibly related to above. 


TSH 1.54, hemoglobin 15.4





Chronic conditions:


CAD: Echo 2021 EF 50-55% with moderate concentric LVH, basal inferior LV 

hypokinesis. Hold ASA. Metoprolol 50 mg PO QD. Lipitor 40 mg PO QHS.  Farxiga 10

mg p.o. daily


COPD: Albuterol neb Q6H PRN SOB/wheezing. No in acute exacerbation.


BPH: Flomax 0.4 mg PO QD.


HTN: Imdur 30 mg PO QD. Amlodipine 10 mg PO QD. Metoprolol as above.





F: None


E: Replete as needed


N: Low fiber diet


A: Ambulatory





DVT ppx: Low-dose heparin drip


GI prophylaxis: IV Protonix 40 mg twice daily


Code Status: Full code





Anticipated discharge place: Pending clinical course


Anticipated discharge date: Pending clinical course





I have seen and evaluated the patient today.  Discussed with the resident and 

agree with the residents finding and plan as documented in the resident's note. 


Patient had one small BM watery and black.  on telemetry. Surgery 

recommends no surgical intervention.


Amiodarone drip switched to PO. 


Maintained on Cardizem drip and Heparin drip.


Discussed with JAYDE Rae stool occult pending.





Objective





- Vital Signs


Vital signs: 


                                   Vital Signs











Temp  97.6 F   09/11/24 08:00


 


Pulse  122 H  09/11/24 08:00


 


Resp  16   09/11/24 08:00


 


BP  100/71   09/11/24 08:00


 


Pulse Ox  96   09/11/24 08:00


 


FiO2      








                                 Intake & Output











 09/10/24 09/11/24 09/11/24





 18:59 06:59 18:59


 


Intake Total 599.5 552 200


 


Output Total 700 700 


 


Balance -100.5 -148 200


 


Weight 77.111 kg 77.6 kg 


 


Intake:   


 


  Intake, IV Titration 119.5 312 





  Amount   


 


    Amiodarone 450 mg In  140 





    Dextrose 5% in Water 250   





    ml @ 0.5 MG/MIN 16.667   





    mls/hr IV .Q15H [facility] Rx#:   





    [id number]   


 


    Diltiazem 125 mg In 119.5  





    Sodium Chloride 0.9% 100   





    ml @ 5 MG/HR 5 mls/hr IV   





    .Q24H [facility] Rx#:[id number]   


 


    Heparin Sod,Pork in 0.45%  72 





    NaCl 25,000 unit In 0.45   





    % NaCl 1 250ml.bag @ 12   





    UNITS/KG/HR 9.253 mls/hr   





    IV .Q24H [facility] Rx#:   





    [id number]   


 


    Piperacillin-Tazobactam 3  100 





    .375 gm In Sodium   





    Chloride 0.9% 100 ml @ 25   





    mls/hr IVPB Q8HR [facility] Rx#   





    :[id number]   


 


  Oral 480 240 200


 


Output:   


 


  Urine 700 700 














- Labs


CBC & Chem 7: 


                                 09/11/24 09:13





                                 09/10/24 04:51


Labs: 


                  Abnormal Lab Results - Last 24 Hours (Table)











  09/10/24 09/11/24 09/11/24 Range/Units





  22:11 09:13 09:13 


 


WBC   12.1 H   (3.8-10.6)  k/uL


 


Neutrophils #   9.9 H   (1.3-7.7)  k/uL


 


APTT  50.3 H   36.7 H  (22.0-30.0)  sec

## 2024-09-11 NOTE — P.PN
Subjective


Progress Note Date: 09/11/24


HISTORY OF PRESENT ILLNESS:  





This is a 61-year-old male with past medical history of CAD, A-fib on Xarelto, 

COPD, hypertension, and cardiomyopathy.  Patient follows in the office with Dr. Forrest.  We have been asked to see the patient in consultation for chest pain 

and elevated troponins.  Patient was examined at the bedside in the emergency 

room.  Patient states he has been having melanotic stools and diarrhea that 

started over Labor Day weekend.  PCP had ordered a CTAP which showed prominent 

appendix thickness with mild inflammatory changes concerning for appendicitis 

and was started on Cipro and Flagyl, which improved his symptoms but then they 

returned.  Patient had blood work done yesterday morning and the doctor called 

and told him to come in due to elevated troponins.  Patient denies chest pain, 

shortness of breath, fevers or chills at this time





Most recent echocardiogram September 9, 2024 showed LVEF estimated 40%, moderate

LV systolic dysfunction with dilated left ventricle, dilated right ventricle


Labs today show WBC 12.1.  PTT 36.7.





9/11


Patient's blood pressure is 100/71.  Heart rate is in the 120s.  Satting 94% on 

2 L nasal cannula.  Patient denies any abdominal pain and states he is hungry 

diet will be advanced per surgery.





PHYSICAL EXAMINATION


Vital signs reviewed.


Head: Normocephalic.


Eyes: Sclerae nonicteric.


Neck: Brisk carotid upstroke, no jugular venous distention.


Lungs: Clear to auscultation.  No significant wheezing or rhonchi appreciated


Heart: Irregular rhythm, S1-S2, no murmur or rub.


Abdomen: Soft nontender, positive bowel sounds.


Extremities: 2+ bilateral lower extremity edema


Neuro: Alert, oritented, no focal deficits. Detailed neuro exam was not 

performed. 





ASSESSMENT


Acute appendicitis


Troponin elevation: Flat


Atrial flutter


History of CAD 


History of A-fib


Hypertension


Hyperlipidemia


Tobacco use





PLAN


Surgery on board, recommending conservative approach


Discontinued amlodipine


Started Lasix 40 mg IV twice daily


IV amiodarone has been transitioned to 400 mg PO daily


Continue Cardizem drip








Continue home cardiac meds except Xarelto which has been substituted with 

heparin due to potential surgical intervention


Patient is cleared from cardiac standpoint for surgical intervention regarding 

appendicitis.





Monitor renal function


Monitor vital signs


Further recommendations to follow based upon clinical course











Objective





- Vital Signs


Vital signs: 


                                   Vital Signs











Temp  97.6 F   09/11/24 08:00


 


Pulse  133 H  09/11/24 16:00


 


Resp  16   09/11/24 16:00


 


BP  90/51   09/11/24 16:00


 


Pulse Ox  94 L  09/11/24 16:00


 


FiO2      








                                 Intake & Output











 09/10/24 09/11/24 09/11/24





 18:59 06:59 18:59


 


Intake Total 599.5 552 791.305


 


Output Total 700 700 


 


Balance -100.5 -148 791.305


 


Weight 77.111 kg 77.6 kg 


 


Intake:   


 


  Intake, IV Titration 119.5 312 351.305





  Amount   


 


    Amiodarone 450 mg In  140 





    Dextrose 5% in Water 250   





    ml @ 0.5 MG/MIN 16.667   





    mls/hr IV .Q15H [facility] Rx#:   





    [id number]   


 


    Diltiazem 125 mg In 119.5  124.833





    Sodium Chloride 0.9% 100   





    ml @ 5 MG/HR 5 mls/hr IV   





    .Q24H [facility] Rx#:[id number]   


 


    Heparin Sod,Pork in 0.45%  72 226.472





    NaCl 25,000 unit In 0.45   





    % NaCl 1 250ml.bag @ 12   





    UNITS/KG/HR 9.253 mls/hr   





    IV .Q24H [facility] Rx#:   





    [id number]   


 


    Piperacillin-Tazobactam 3  100 





    .375 gm In Sodium   





    Chloride 0.9% 100 ml @ 25   





    mls/hr IVPB Q8HR [facility] Rx#   





    :[id number]   


 


  Oral 480 240 440


 


Output:   


 


  Urine 700 700 














- Labs


CBC & Chem 7: 


                                 09/11/24 09:13





                                 09/10/24 04:51


Labs: 


                  Abnormal Lab Results - Last 24 Hours (Table)











  09/10/24 09/11/24 09/11/24 Range/Units





  22:11 09:13 09:13 


 


WBC   12.1 H   (3.8-10.6)  k/uL


 


Neutrophils #   9.9 H   (1.3-7.7)  k/uL


 


APTT  50.3 H   36.7 H  (22.0-30.0)  sec

## 2024-09-12 LAB
ANION GAP SERPL CALC-SCNC: 7 MMOL/L
APTT BLD: 50.6 SEC (ref 22–30)
BASOPHILS # BLD AUTO: 0 K/UL (ref 0–0.2)
BASOPHILS # BLD AUTO: 0.1 K/UL (ref 0–0.2)
BASOPHILS NFR BLD AUTO: 0 %
BASOPHILS NFR BLD AUTO: 1 %
BUN SERPL-SCNC: 9 MG/DL (ref 9–20)
CALCIUM SPEC-MCNC: 9.2 MG/DL (ref 8.4–10.2)
CHLORIDE SERPL-SCNC: 103 MMOL/L (ref 98–107)
CO2 SERPL-SCNC: 28 MMOL/L (ref 22–30)
EOSINOPHIL # BLD AUTO: 0.2 K/UL (ref 0–0.7)
EOSINOPHIL # BLD AUTO: 0.2 K/UL (ref 0–0.7)
EOSINOPHIL NFR BLD AUTO: 2 %
EOSINOPHIL NFR BLD AUTO: 2 %
ERYTHROCYTE [DISTWIDTH] IN BLOOD BY AUTOMATED COUNT: 4.91 M/UL (ref 4.3–5.9)
ERYTHROCYTE [DISTWIDTH] IN BLOOD BY AUTOMATED COUNT: 5.01 M/UL (ref 4.3–5.9)
ERYTHROCYTE [DISTWIDTH] IN BLOOD: 13.2 % (ref 11.5–15.5)
ERYTHROCYTE [DISTWIDTH] IN BLOOD: 13.4 % (ref 11.5–15.5)
GLUCOSE SERPL-MCNC: 112 MG/DL (ref 74–99)
HCT VFR BLD AUTO: 46.1 % (ref 39–53)
HCT VFR BLD AUTO: 46.3 % (ref 39–53)
HGB BLD-MCNC: 15 GM/DL (ref 13–17.5)
HGB BLD-MCNC: 15.4 GM/DL (ref 13–17.5)
INR PPP: 1 (ref ?–1.2)
LYMPHOCYTES # SPEC AUTO: 1 K/UL (ref 1–4.8)
LYMPHOCYTES # SPEC AUTO: 1.1 K/UL (ref 1–4.8)
LYMPHOCYTES NFR SPEC AUTO: 10 %
LYMPHOCYTES NFR SPEC AUTO: 10 %
MCH RBC QN AUTO: 30.6 PG (ref 25–35)
MCH RBC QN AUTO: 30.8 PG (ref 25–35)
MCHC RBC AUTO-ENTMCNC: 32.5 G/DL (ref 31–37)
MCHC RBC AUTO-ENTMCNC: 33.4 G/DL (ref 31–37)
MCV RBC AUTO: 92.1 FL (ref 80–100)
MCV RBC AUTO: 94.3 FL (ref 80–100)
MONOCYTES # BLD AUTO: 0.6 K/UL (ref 0–1)
MONOCYTES # BLD AUTO: 0.7 K/UL (ref 0–1)
MONOCYTES NFR BLD AUTO: 6 %
MONOCYTES NFR BLD AUTO: 6 %
NEUTROPHILS # BLD AUTO: 8.1 K/UL (ref 1.3–7.7)
NEUTROPHILS # BLD AUTO: 8.8 K/UL (ref 1.3–7.7)
NEUTROPHILS NFR BLD AUTO: 79 %
NEUTROPHILS NFR BLD AUTO: 80 %
PLATELET # BLD AUTO: 374 K/UL (ref 150–450)
PLATELET # BLD AUTO: 396 K/UL (ref 150–450)
POTASSIUM SERPL-SCNC: 3.8 MMOL/L (ref 3.5–5.1)
PT BLD: 11 SEC (ref 10–12.5)
SODIUM SERPL-SCNC: 138 MMOL/L (ref 137–145)
WBC # BLD AUTO: 10.3 K/UL (ref 3.8–10.6)
WBC # BLD AUTO: 11 K/UL (ref 3.8–10.6)

## 2024-09-12 RX ADMIN — HEPARIN SODIUM SCH MLS/HR: 10000 INJECTION, SOLUTION INTRAVENOUS at 16:29

## 2024-09-12 RX ADMIN — RIVAROXABAN SCH: 20 TABLET, FILM COATED ORAL at 16:16

## 2024-09-12 NOTE — P.PN
Subjective





Patient seen and evaluated at bedside. Patient doing well, tolerating diet, no 

pain.  





Objective





- Vital Signs


Vital signs: 


                                   Vital Signs











Temp  97.9 F   09/12/24 20:00


 


Pulse  64   09/12/24 20:00


 


Resp  18   09/12/24 20:00


 


BP  109/66   09/12/24 20:00


 


Pulse Ox  95   09/12/24 20:00


 


FiO2      








                                 Intake & Output











 09/12/24 09/12/24 09/13/24





 06:59 18:59 06:59


 


Intake Total 57.333 1335.588 


 


Output Total 200  


 


Balance -908.868 1241.588 


 


Weight 76.7 kg  


 


Intake:   


 


  Intake, IV Titration 57.333 195.588 





  Amount   


 


    Diltiazem 125 mg In 57.333  





    Sodium Chloride 0.9% 100   





    ml @ 5 MG/HR 5 mls/hr IV   





    .Q24H [facility] Rx#:[id number]   


 


    Heparin Sod,Pork in 0.45%  195.588 





    NaCl 25,000 unit In 0.45   





    % NaCl 1 250ml.bag @ 12   





    UNITS/KG/HR 9.253 mls/hr   





    IV .Q24H [facility] Rx#:   





    [id number]   


 


  Oral  1140 


 


Output:   


 


  Urine 200  


 


Other:   


 


  # Voids  2 














- Exam





gen: nad


cv: rrr


pul: non labored


abd: soft, non distended, non tender





- Labs


CBC & Chem 7: 


                                 09/12/24 16:42





                                 09/12/24 07:26


Labs: 


                  Abnormal Lab Results - Last 24 Hours (Table)











  09/12/24 09/12/24 09/12/24 Range/Units





  07:26 07:26 07:26 


 


WBC  11.0 H    (3.8-10.6)  k/uL


 


Neutrophils #  8.8 H    (1.3-7.7)  k/uL


 


APTT    132.7 H*  (22.0-30.0)  sec


 


Glucose   112 H   (74-99)  mg/dL














  09/12/24 09/12/24 09/12/24 Range/Units





  16:42 16:42 21:56 


 


WBC     (3.8-10.6)  k/uL


 


Neutrophils #  8.1 H    (1.3-7.7)  k/uL


 


APTT   50.6 H  43.3 H  (22.0-30.0)  sec


 


Glucose     (74-99)  mg/dL














Assessment and Plan


Assessment: 


69 w/ suspected appendicitis


-patient asx, tolerating diet, receive abx outpatient


-stable for discharge





Time with Patient: Less than 30

## 2024-09-12 NOTE — P.PN
Subjective


Progress Note Date: 09/12/24





HISTORY OF PRESENT ILLNESS:  





This is a 61-year-old male with past medical history of CAD, A-fib on Xarelto, 

COPD, hypertension, and cardiomyopathy.  Patient follows in the office with Dr. Forrest.  We have been asked to see the patient in consultation for chest pain 

and elevated troponins.  Patient was examined at the bedside in the emergency 

room.  Patient states he has been having melanotic stools and diarrhea that 

started over Labor Day weekend.  PCP had ordered a CTAP which showed prominent 

appendix thickness with mild inflammatory changes concerning for appendicitis 

and was started on Cipro and Flagyl, which improved his symptoms but then they 

returned.  Patient had blood work done yesterday morning and the doctor called 

and told him to come in due to elevated troponins.  Patient denies chest pain, 

shortness of breath, fevers or chills at this time





Most recent echocardiogram September 9, 2024 showed LVEF estimated 40%, moderate

LV systolic dysfunction with dilated left ventricle, dilated right ventricle


Labs today show WBC 12.1.  PTT 36.7.





9/11


Patient's blood pressure is 100/71.  Heart rate is in the 120s.  Satting 94% on 

2 L nasal cannula.  Patient denies any abdominal pain and states he is hungry 

diet will be advanced per surgery.





9/12


Yesterday, patient was started on IV Lasix 40 mg twice daily.  He has been 

maintained on heparin drip for 48 hours.  Patient has converted to a sinus 

rhythm and heart rate is running in the 90s.  Blood pressure 107/67, pulse ox 

95% on room air. Patient denies having any chest pain.  He has been seen and 

followed by general surgery with no plan for surgery for appendectomy.  Patient 

is agreeable to move forward tomorrow with Lexiscan stress test.





PHYSICAL EXAMINATION


Vital signs reviewed.


Head: Normocephalic.


Eyes: Sclerae nonicteric.


Neck: Brisk carotid upstroke, no jugular venous distention.


Lungs: Clear to auscultation.  No significant wheezing or rhonchi appreciated


Heart: Irregular rhythm, S1-S2, no murmur or rub.


Abdomen: Soft nontender, positive bowel sounds.


Extremities: 2+ bilateral lower extremity edema


Neuro: Alert, oritented, no focal deficits. Detailed neuro exam was not 

performed. 





ASSESSMENT


Acute appendicitis with no plan for surgical intervention


Troponin elevation: Flat


Atrial flutter, typical, with RVR converted to sinus rhythm


History of CAD 


History of A-fib


Hypertension


Hyperlipidemia


Tobacco use





PLAN


Continue Lasix 40 mg IV twice daily for another 24 hours


Continue the following cardiac medications: Amiodarone 400 mg twice daily, 

atorvastatin 40 mg at bedtime, Farxiga 10 mg daily, Toprol XL 50 mg daily


400 mg PO daily


Discontinue Cardizem drip, if needed will increase beta-blocker for rate control


Continue heparin drip and hold Xarelto


Schedule patient for Lexiscan stress test tomorrow


N.p.o. after midnight


Monitor NATALI, daily weights, electrolytes and renal function


Further recommendations to follow based upon clinical course





Nurse practitioner note has been reviewed, I agree with documented findings and 

plan of care.  Patient was seen and examined.











Objective





- Vital Signs


Vital signs: 


                                   Vital Signs











Temp  98.4 F   09/12/24 11:52


 


Pulse  56 L  09/12/24 11:52


 


Resp  15   09/12/24 11:52


 


BP  104/67   09/12/24 11:52


 


Pulse Ox  95   09/12/24 08:00


 


FiO2      








                                 Intake & Output











 09/11/24 09/12/24 09/12/24





 18:59 06:59 18:59


 


Intake Total 791.305 57.333 375.588


 


Output Total  200 


 


Balance 791.305 -142.667 375.588


 


Weight  76.7 kg 


 


Intake:   


 


  Intake, IV Titration 351.305 57.333 195.588





  Amount   


 


    Diltiazem 125 mg In 124.833 57.333 





    Sodium Chloride 0.9% 100   





    ml @ 5 MG/HR 5 mls/hr IV   





    .Q24H [facility] Rx#:[id number]   


 


    Heparin Sod,Pork in 0.45% 226.472  195.588





    NaCl 25,000 unit In 0.45   





    % NaCl 1 250ml.bag @ 12   





    UNITS/KG/HR 9.253 mls/hr   





    IV .Q24H [facility] Rx#:   





    [id number]   


 


  Oral 440  180


 


Output:   


 


  Urine  200 


 


Other:   


 


  # Voids 3  1














- Labs


CBC & Chem 7: 


                                 09/12/24 16:42





                                 09/12/24 07:26


Labs: 


                  Abnormal Lab Results - Last 24 Hours (Table)











  09/11/24 09/12/24 09/12/24 Range/Units





  17:20 07:26 07:26 


 


WBC   11.0 H   (3.8-10.6)  k/uL


 


Neutrophils #   8.8 H   (1.3-7.7)  k/uL


 


APTT  45.9 H    (22.0-30.0)  sec


 


Glucose    112 H  (74-99)  mg/dL














  09/12/24 Range/Units





  07:26 


 


WBC   (3.8-10.6)  k/uL


 


Neutrophils #   (1.3-7.7)  k/uL


 


APTT  132.7 H*  (22.0-30.0)  sec


 


Glucose   (74-99)  mg/dL

## 2024-09-12 NOTE — XR
EXAMINATION TYPE: XR chest 1V portable

 

DATE OF EXAM: 9/12/2024

 

HISTORY: Shortness of breath.

 

COMPARISON: 9/9/2024

 

TECHNIQUE: Single view of the chest is submitted.

 

FINDINGS:

Demonstrated are scattered senescent parenchymal change.  

 

There is no evidence for focal infiltrate. 

 

The heart is stable.

 

Hilar and mediastinal structures are within normal limits.  

 

Degenerative changes are seen of the dorsal spine. 

 

 IMPRESSION: 

 

1.  Chronic changes without evidence for acute pulmonary disease.

## 2024-09-12 NOTE — P.PN
Subjective


Progress Note Date: 09/12/24





Subjective: 


Patient seen and examined at the bedside.  No acute events overnight.  Patient 

denies abdominal pain.  Continues melanoti to have bowel movement.  No bright 

red blood per rectum.





All Systems reviewed and pertinent positives and negatives noted in HPI, all 

other symptoms are negative





Objective:





Vital signs reviewed. 





General: non toxic, no distress, appears at stated age, overweight


Derm: no unusual rashes/lesions, warm


Head: atraumatic, normocephalic, symmetric


Eyes: EOMI, no lid lag, anicteric sclera, pupils equal round reactive to light


ENT: Nose and ears atraumatic


Neck: No cervical lymphadenopathy, trachea midline, supple


Mouth: no lip lesion, mucus membranes moist


Cardiovascular: S1S2 regular, s3, no murmur, positive dorsalis pedis pulse 

bilateral, no edema


Lungs: Decreased breath sounds Left base no rhonchi, no rales, no accessory 

muscle use


Abdominal: soft,  nontender to palpation, no guarding


Ext: muscle strength 5 out of 5 in all 4 extremities grossly, no gross muscle 

atrophy, no contractures,


Neuro:  CN II-XI grossly intact, no gross focal neuro deficits


Psych: Alert, oriented, appropriate affect





Data reviewed today: 





Labs: WBC 11.0, hemoglobin 15.4, APTT 132.7, sodium 138, potassium 3.8, 

creatinine 0.78, BUN 9, glucose 112


Stool occult blood is negative.





No new imaging.





Assessment and Plan: 


61-year-old male with PMH of CAD, A-fib on Xarelto, COPD, BPH, HTN presents to 

the ER for abnormal lab work.  Patient had an outpatient CT abdomen and pelvis 

on 9/4/2024 which showed prominent appendix thickness with mild inflammatory 

changes correlating for appendicitis, diverticulosis and saccular aneurysm 

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  His outpatient 

troponin I was also elevated.  Patient was admitted for appendicitis and 

elevated troponin with concerns for ACS.  Cardiology consulted.  ACS ruled out. 

Surgery consulted.  No surgical intervention at this time patient to be managed 

medically.  Patient on IV antibiotics.





#Acute appendicitis, abdominal pain resolved


Patient denies abdominal pain


Continue with Zosyn 3.375g IV TID. 


Surgery consulted; no surgical intervention at this time, patient to continue on

IV antibiotics with transition to oral antibiotics 


Continue with low fiber diet





#Melena


Stool occult blood test is negative.


Patient continued to have black-colored stools.  C-scope 2015 showed sigmoid 

diverticulosis. Protonix 40 mg IV BID.


Repeat CBC tomorrow morning.


Patient likely need outpatient C-scope for further investigation.





#A-fib with RVR


#Troponin elevation, ACS unlikely


Troponin I trended flat at 0.076, 0.073, 0.089; stop trending


Echocardiogram shows LVEF at 40%.  Moderate LV systolic dysfunction with a 

dilated left ventricle.


Telemetry monitoring.


Cardiology consulted; rate control with IV Cardizem, amiodarone 400 mg p.o. 

twice daily and metoprolol 50 mg p.o. daily 


Xarelto 20 mg p.o. daily for anticoagulation


MPI stress test tomorrow





#Fatigue


Possibly related to above. 


TSH 1.54, hemoglobin 15.4





Chronic conditions:


CAD: Echo 2021 EF 50-55% with moderate concentric LVH, basal inferior LV 

hypokinesis. Hold ASA. Metoprolol 50 mg PO QD. Lipitor 40 mg PO QHS.  Farxiga 10

mg p.o. daily


COPD: Albuterol neb Q6H PRN SOB/wheezing. No in acute exacerbation.


BPH: Flomax 0.4 mg PO QD.


HTN: Imdur 30 mg PO QD (on hold). continue with amlodipine 10 mg PO QD. M

etoprolol as above.





F: None


E: Replete as needed


N: Low fiber diet


A: Ambulatory





DVT ppx: Low-dose heparin drip


GI prophylaxis: IV Protonix 40 mg twice daily


Code Status: Full code





Anticipated discharge place: Pending clinical course


Anticipated discharge date: Pending clinical course





I have seen and evaluated the patient today.  Discussed with the resident and 

agree with the residents finding and plan as documented in the resident's note. 


Patient had one small BM watery and black. Stool occult negative.


Converted to sinus rhythm. Cardiology plans for stress test tomorrow. Continued 

on Heparin drip.











Objective





- Vital Signs


Vital signs: 


                                   Vital Signs











Temp  97.5 F L  09/12/24 08:00


 


Pulse  62   09/12/24 08:00


 


Resp  16   09/12/24 08:00


 


BP  107/67   09/12/24 08:00


 


Pulse Ox  95   09/12/24 08:00


 


FiO2      








                                 Intake & Output











 09/11/24 09/12/24 09/12/24





 18:59 06:59 18:59


 


Intake Total 791.305 57.333 180


 


Output Total  200 


 


Balance 791.305 -142.667 180


 


Weight  76.7 kg 


 


Intake:   


 


  Intake, IV Titration 351.305 57.333 





  Amount   


 


    Diltiazem 125 mg In 124.833 57.333 





    Sodium Chloride 0.9% 100   





    ml @ 5 MG/HR 5 mls/hr IV   





    .Q24H [facility] Rx#:[id number]   


 


    Heparin Sod,Pork in 0.45% 226.472  





    NaCl 25,000 unit In 0.45   





    % NaCl 1 250ml.bag @ 12   





    UNITS/KG/HR 9.253 mls/hr   





    IV .Q24H [facility] Rx#:   





    [id number]   


 


  Oral 440  180


 


Output:   


 


  Urine  200 


 


Other:   


 


  # Voids 3  1














- Labs


CBC & Chem 7: 


                                 09/12/24 16:42





                                 09/12/24 07:26


Labs: 


                  Abnormal Lab Results - Last 24 Hours (Table)











  09/11/24 09/12/24 09/12/24 Range/Units





  17:20 07:26 07:26 


 


WBC   11.0 H   (3.8-10.6)  k/uL


 


Neutrophils #   8.8 H   (1.3-7.7)  k/uL


 


APTT  45.9 H    (22.0-30.0)  sec


 


Glucose    112 H  (74-99)  mg/dL














  09/12/24 Range/Units





  07:26 


 


WBC   (3.8-10.6)  k/uL


 


Neutrophils #   (1.3-7.7)  k/uL


 


APTT  132.7 H*  (22.0-30.0)  sec


 


Glucose   (74-99)  mg/dL

## 2024-09-13 LAB
ANION GAP SERPL CALC-SCNC: 3 MMOL/L
ANION GAP SERPL CALC-SCNC: 3 MMOL/L
APTT BLD: 39.4 SEC (ref 22–30)
BASOPHILS # BLD AUTO: 0.1 K/UL (ref 0–0.2)
BASOPHILS NFR BLD AUTO: 1 %
BUN SERPL-SCNC: 8 MG/DL (ref 9–20)
BUN SERPL-SCNC: 8 MG/DL (ref 9–20)
CALCIUM SPEC-MCNC: 8.9 MG/DL (ref 8.4–10.2)
CALCIUM SPEC-MCNC: 9.1 MG/DL (ref 8.4–10.2)
CHLORIDE SERPL-SCNC: 100 MMOL/L (ref 98–107)
CHLORIDE SERPL-SCNC: 101 MMOL/L (ref 98–107)
CO2 SERPL-SCNC: 34 MMOL/L (ref 22–30)
CO2 SERPL-SCNC: 35 MMOL/L (ref 22–30)
EOSINOPHIL # BLD AUTO: 0.2 K/UL (ref 0–0.7)
EOSINOPHIL NFR BLD AUTO: 2 %
ERYTHROCYTE [DISTWIDTH] IN BLOOD BY AUTOMATED COUNT: 4.74 M/UL (ref 4.3–5.9)
ERYTHROCYTE [DISTWIDTH] IN BLOOD: 13.3 % (ref 11.5–15.5)
GLUCOSE SERPL-MCNC: 105 MG/DL (ref 74–99)
GLUCOSE SERPL-MCNC: 114 MG/DL (ref 74–99)
HCT VFR BLD AUTO: 44.1 % (ref 39–53)
HGB BLD-MCNC: 14.1 GM/DL (ref 13–17.5)
INR PPP: 1 (ref ?–1.2)
LYMPHOCYTES # SPEC AUTO: 1.1 K/UL (ref 1–4.8)
LYMPHOCYTES NFR SPEC AUTO: 13 %
MAGNESIUM SPEC-SCNC: 1.8 MG/DL (ref 1.6–2.3)
MCH RBC QN AUTO: 29.7 PG (ref 25–35)
MCHC RBC AUTO-ENTMCNC: 31.9 G/DL (ref 31–37)
MCV RBC AUTO: 93.1 FL (ref 80–100)
MONOCYTES # BLD AUTO: 0.5 K/UL (ref 0–1)
MONOCYTES NFR BLD AUTO: 6 %
NEUTROPHILS # BLD AUTO: 6.3 K/UL (ref 1.3–7.7)
NEUTROPHILS NFR BLD AUTO: 76 %
PLATELET # BLD AUTO: 361 K/UL (ref 150–450)
POTASSIUM SERPL-SCNC: 3.6 MMOL/L (ref 3.5–5.1)
POTASSIUM SERPL-SCNC: 3.8 MMOL/L (ref 3.5–5.1)
PT BLD: 11.3 SEC (ref 10–12.5)
SODIUM SERPL-SCNC: 138 MMOL/L (ref 137–145)
SODIUM SERPL-SCNC: 138 MMOL/L (ref 137–145)
WBC # BLD AUTO: 8.3 K/UL (ref 3.8–10.6)

## 2024-09-13 PROCEDURE — 4A02XM4 MEASUREMENT OF CARDIAC TOTAL ACTIVITY, EXTERNAL APPROACH: ICD-10-PCS

## 2024-09-13 RX ADMIN — FUROSEMIDE SCH MG: 40 TABLET ORAL at 17:57

## 2024-09-13 NOTE — P.PN
Subjective


Progress Note Date: 09/13/24





HISTORY OF PRESENT ILLNESS:  





This is a 61-year-old male with past medical history of CAD, A-fib on Xarelto, 

COPD, hypertension, and cardiomyopathy.  Patient follows in the office with Dr. Forrest.  We have been asked to see the patient in consultation for chest pain 

and elevated troponins.  Patient was examined at the bedside in the emergency 

room.  Patient states he has been having melanotic stools and diarrhea that 

started over Labor Day weekend.  PCP had ordered a CTAP which showed prominent 

appendix thickness with mild inflammatory changes concerning for appendicitis 

and was started on Cipro and Flagyl, which improved his symptoms but then they 

returned.  Patient had blood work done yesterday morning and the doctor called 

and told him to come in due to elevated troponins.  Patient denies chest pain, 

shortness of breath, fevers or chills at this time





Most recent echocardiogram September 9, 2024 showed LVEF estimated 40%, moderate

LV systolic dysfunction with dilated left ventricle, dilated right ventricle


Labs today show WBC 12.1.  PTT 36.7.





9/11


Patient's blood pressure is 100/71.  Heart rate is in the 120s.  Satting 94% on 

2 L nasal cannula.  Patient denies any abdominal pain and states he is hungry 

diet will be advanced per surgery.





9/12


Yesterday, patient was started on IV Lasix 40 mg twice daily.  He has been 

maintained on heparin drip for 48 hours.  Patient has converted to a sinus 

rhythm and heart rate is running in the 90s.  Blood pressure 107/67, pulse ox 

95% on room air. Patient denies having any chest pain.  He has been seen and 

followed by general surgery with no plan for surgery for appendectomy.  Patient 

is agreeable to move forward tomorrow with Lexiscan stress test.





9/13


Patient is scheduled for Lexiscan stress test today.  Blood pressure 104/61, 

heart rate 45, pulse ox 90% on room air.  Repeat blood work reveals BUN 8 

creatinine 0.77.  Patient has been maintained on IV Lasix 40 mg twice daily.  

Patient remains on a heparin drip until results of Lexiscan is available.  

Xarelto remains on hold





PHYSICAL EXAMINATION


Vital signs reviewed.


Head: Normocephalic.


Eyes: Sclerae nonicteric.


Neck: Brisk carotid upstroke, no jugular venous distention.


Lungs: Clear to auscultation.  No significant wheezing or rhonchi appreciated


Heart: Irregular rhythm, S1-S2, no murmur or rub.


Abdomen: Soft nontender, positive bowel sounds.


Extremities: 2+ bilateral lower extremity edema


Neuro: Alert, oritented, no focal deficits. Detailed neuro exam was not perform

ed. 





ASSESSMENT


Acute appendicitis with no plan for surgical intervention


Troponin elevation: Flat


Atrial flutter, typical, with RVR converted to sinus rhythm


History of CAD 


History of A-fib


Hypertension


Hyperlipidemia


Tobacco use





PLAN


Transition IV Lasix to oral 40 mg twice daily


Continue the following cardiac medications: Amiodarone 400 mg twice daily, 

atorvastatin 40 mg at bedtime, Farxiga 10 mg daily, Toprol XL 50 mg daily


400 mg PO daily


Continue heparin drip and hold Xarelto


Schedule patient for Lexiscan stress test today


Monitor NATALI, daily weights, electrolytes and renal function


Further recommendations to follow based upon clinical course





Nurse practitioner note has been reviewed, I agree with documented findings and 

plan of care.  Patient was seen and examined.











Objective





- Vital Signs


Vital signs: 


                                   Vital Signs











Temp  97.6 F   09/13/24 04:00


 


Pulse  60   09/13/24 11:59


 


Resp  16   09/13/24 08:00


 


BP  104/61   09/13/24 08:00


 


Pulse Ox  90 L  09/13/24 08:00


 


FiO2      








                                 Intake & Output











 09/12/24 09/13/24 09/13/24





 18:59 06:59 18:59


 


Intake Total 1335.588 96.211 


 


Output Total  400 325


 


Balance 1335.588 -303.789 -325


 


Weight  78.5 kg 


 


Intake:   


 


  Intake, IV Titration 195.588 96.211 





  Amount   


 


    Heparin Sod,Pork in 0.45%  96.211 





    NaCl 25,000 unit In 0.45   





    % NaCl 1 250ml.bag @ 12   





    UNITS/KG/HR 9.204 mls/hr   





    IV .Q24H [facility] Rx#:   





    [id number]   


 


    Heparin Sod,Pork in 0.45% 195.588  





    NaCl 25,000 unit In 0.45   





    % NaCl 1 250ml.bag @ 12   





    UNITS/KG/HR 9.253 mls/hr   





    IV .Q24H [facility] Rx#:   





    [id number]   


 


  Oral 1140  


 


Output:   


 


  Urine  400 325


 


Other:   


 


  # Voids 2 1 














- Labs


CBC & Chem 7: 


                                 09/13/24 03:59





                                 09/13/24 08:00


Labs: 


                  Abnormal Lab Results - Last 24 Hours (Table)











  09/12/24 09/12/24 09/12/24 Range/Units





  16:42 16:42 21:56 


 


Neutrophils #  8.1 H    (1.3-7.7)  k/uL


 


APTT   50.6 H  43.3 H  (22.0-30.0)  sec


 


Carbon Dioxide     (22-30)  mmol/L


 


BUN     (9-20)  mg/dL


 


Glucose     (74-99)  mg/dL














  09/13/24 09/13/24 09/13/24 Range/Units





  03:59 03:59 08:00 


 


Neutrophils #     (1.3-7.7)  k/uL


 


APTT   39.4 H   (22.0-30.0)  sec


 


Carbon Dioxide  34 H   35 H  (22-30)  mmol/L


 


BUN  8 L   8 L  (9-20)  mg/dL


 


Glucose  114 H   105 H  (74-99)  mg/dL

## 2024-09-13 NOTE — CA
Lexiscan Nuclear Stress Test Report 

 

 Name:    Floyd Lewis 

 

 MRN:    [id number] 

 

 Exam Date: 2024 09:06 

 

 Exam Location:      [facility]  

                     Stress 

 

 Ht (in):     64     Wt (lb):     169    BSA:    1.82 

 

 Ordering Phys:       Kelley Chen 

 

 Referring Phys:      ROSEMARIE, 

 

 Technologist:        Humberto Archuleta 

 

 Age:    69    Gender:    M 

 

 :    1955 

 Procedure CPT: 

 

 Indications:              Reflex order-Stress test 

 

 ICD-10 Codes: 

 

 Patient History:          DIFFICULTY IN BREATHING, PALPITATIONS, HTN,  

                           PRIOR STROKE, HYPERCHOLESTEROLEMIA, PRIOR  

                           MI, PRIOR SMOKER, COPD 

 

 Medications: 

 

 Meds past 24 hrs: 

 

 Pretest Chest Pain: 

 

 STRESS TEST      Lexiscan 

 

 Protocol 

 

 

 

 

 Exercise Duration (min:sec):         01:04 

 Max ST Depressions (mm): 

 Angina Score: 

 Emery Score: 

 Resting HR (bpm):      53 

 

 Peak HR (bpm):         69 

 

 Resting BP (mmHg):       130    /   93 

 

 Peak BP (mmHg):       130   /   93 

 

 MPHR:    152     Target HR:      129 

 

 % MPHR:     45 

 METS:  1.0 

 

 Total Dose: 

 Peak Dose: 

 Atropine: 

 Double Product:       8970 

 

 BP Response: 

 

 Stress Termination:       INFUSION COMPLETE 

 

 Stress Symptoms: 

 NO SYMPTOMS 

 

 Stress Summary: 

 

 

  ECG ANALYSIS 

 

 Resting ECG:     Sinus bradycardia and poor R-wave progression  

                  nonspecific ST-T wave changes 

 

 Stress ECG:      Patient was given intravenous Lexiscan as a  

                  protocol did not have chest pain or diagnostic ST  

                  segment depression 

 

 CONCLUSIONS 

 Inconclusive EKG part of the stress test due to baseline EKG  

 abnormalities 

 Cardiolite portion of the stress test will be reported  

 separately 

 

 Dr. Sebas Plasencia MD 

 (Electronically Signed) 

 Final Date:      2024 11:05

## 2024-09-13 NOTE — P.PN
Subjective


Progress Note Date: 09/13/24








Subjective: 


Patient seen and examined at the bedside.  No acute events overnight.  





All Systems reviewed and pertinent positives and negatives noted in HPI, all 

other symptoms are negative





Objective:





Vital signs reviewed. 





General: non toxic, no distress, appears at stated age, overweight


Derm: no unusual rashes/lesions, warm


Head: atraumatic, normocephalic, symmetric


Eyes: EOMI, no lid lag, anicteric sclera, pupils equal round reactive to light


ENT: Nose and ears atraumatic


Neck: No cervical lymphadenopathy, trachea midline, supple


Mouth: no lip lesion, mucus membranes moist


Cardiovascular: S1S2 regular, S3, no murmur, positive dorsalis pedis pulse 

bilateral, no edema


Lungs: Decreased breath sounds Left base no rhonchi, no rales, no accessory 

muscle use


Abdominal: soft,  nontender to palpation, no guarding


Ext: muscle strength 5 out of 5 in all 4 extremities grossly, no gross muscle 

atrophy, no contractures,


Neuro:  CN II-XI grossly intact, no gross focal neuro deficits


Psych: Alert, oriented, appropriate affect





Data reviewed today: 





Labs: WBC 8.3, hemoglobin 14.1, platelet count 361, PT 11.3, INR 1.0, APTT 39.4,

sodium 138, potassium 3.8, BUN 8, creatinine 0.76, calcium 9.1, magnesium 1.8





Lexiscan stress test pending








Assessment and Plan: 


61-year-old male with PMH of CAD, A-fib on Xarelto, COPD, BPH, HTN presents to 

the ER for abnormal lab work.  Patient had an outpatient CT abdomen and pelvis 

on 9/4/2024 which showed prominent appendix thickness with mild inflammatory 

changes correlating for appendicitis, diverticulosis and saccular aneurysm 

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  His outpatient 

troponin I was also elevated.  Patient was admitted for appendicitis and 

elevated troponin with concerns for ACS.  Cardiology consulted.  ACS ruled out. 

Surgery consulted.  No surgical intervention at this time patient to be managed 

medically.  Patient on IV antibiotics.  Nuclear stress test today.





#Acute appendicitis, abdominal pain resolved


Patient denies abdominal pain


Continue with Zosyn 3.375g IV TID. 


Surgery consulted; no surgical intervention at this time, patient to continue on

IV antibiotics with transition to oral antibiotics 


Continue with low fiber diet





#Melena


Stool occult blood test is negative.


Patient continued to have black-colored stools.  C-scope 2015 showed sigmoid 

diverticulosis. Protonix 40 mg IV BID.


Repeat CBC tomorrow morning.


Patient likely need outpatient C-scope for further investigation.





#A-fib with RVR, converted to sinus rhythm


#Troponin elevation, ACS unlikely


Troponin I trended flat at 0.076, 0.073, 0.089; stop trending


Echocardiogram shows LVEF at 40%.  Moderate LV systolic dysfunction with a d

ilated left ventricle.


Telemetry monitoring.


Cardiology consulted; continue amiodarone 400 mg p.o. twice daily and metoprolol

50 mg p.o. daily, Farxiga 10 mg daily, Lipitor 40 mg at bedtime


Continue heparin drip, hold Xarelto 


MPI stress test today, results pending





#Fatigue


Possibly related to above. 


TSH 1.54, hemoglobin 15.4





Chronic conditions:


CAD: Echo 2021 EF 50-55% with moderate concentric LVH, basal inferior LV 

hypokinesis. Hold ASA. Metoprolol 50 mg PO QD. Lipitor 40 mg PO QHS.  Farxiga 10

mg p.o. daily


COPD: Albuterol neb Q6H PRN SOB/wheezing. No in acute exacerbation.


BPH: Flomax 0.4 mg PO QD.


HTN: Imdur 30 mg PO QD (on hold).  Hold amlodipine 10 mg PO QD. Metoprolol as 

above.





F: None


E: Replete as needed


N: Low fiber diet


A: Ambulatory





DVT ppx: Low-dose heparin drip


GI prophylaxis: IV Protonix 40 mg twice daily


Code Status: Full code





Anticipated discharge place: Pending clinical course


Anticipated discharge date: Pending clinical course





I have seen and evaluated the patient today.  Discussed with the resident and 

agree with the residents finding and plan as documented in the resident's note.


Underwent stress test today. Results pending. No complaints. Discussed with 

patient and wife at bedside.


Lasix switched to PO.





Objective





- Vital Signs


Vital signs: 


                                   Vital Signs











Temp  97.6 F   09/13/24 04:00


 


Pulse  61   09/13/24 04:00


 


Resp  16   09/13/24 04:00


 


BP  111/72   09/13/24 04:00


 


Pulse Ox  97   09/13/24 04:00


 


FiO2      








                                 Intake & Output











 09/12/24 09/13/24 09/13/24





 18:59 06:59 18:59


 


Intake Total 1335.588 96.211 


 


Output Total  400 325


 


Balance 1335.588 -303.789 -325


 


Weight  78.5 kg 


 


Intake:   


 


  Intake, IV Titration 195.588 96.211 





  Amount   


 


    Heparin Sod,Pork in 0.45%  96.211 





    NaCl 25,000 unit In 0.45   





    % NaCl 1 250ml.bag @ 12   





    UNITS/KG/HR 9.204 mls/hr   





    IV .Q24H [facility] Rx#:   





    [id number]   


 


    Heparin Sod,Pork in 0.45% 195.588  





    NaCl 25,000 unit In 0.45   





    % NaCl 1 250ml.bag @ 12   





    UNITS/KG/HR 9.253 mls/hr   





    IV .Q24H [facility] Rx#:   





    [id number]   


 


  Oral 1140  


 


Output:   


 


  Urine  400 325


 


Other:   


 


  # Voids 2 1 














- Labs


CBC & Chem 7: 


                                 09/13/24 03:59





                                 09/13/24 08:00


Labs: 


                  Abnormal Lab Results - Last 24 Hours (Table)











  09/12/24 09/12/24 09/12/24 Range/Units





  16:42 16:42 21:56 


 


Neutrophils #  8.1 H    (1.3-7.7)  k/uL


 


APTT   50.6 H  43.3 H  (22.0-30.0)  sec


 


Carbon Dioxide     (22-30)  mmol/L


 


BUN     (9-20)  mg/dL


 


Glucose     (74-99)  mg/dL














  09/13/24 09/13/24 09/13/24 Range/Units





  03:59 03:59 08:00 


 


Neutrophils #     (1.3-7.7)  k/uL


 


APTT   39.4 H   (22.0-30.0)  sec


 


Carbon Dioxide  34 H   35 H  (22-30)  mmol/L


 


BUN  8 L   8 L  (9-20)  mg/dL


 


Glucose  114 H   105 H  (74-99)  mg/dL

## 2024-09-13 NOTE — P.PN
Subjective





patient seen and evaluated at bedside.  Patient tolerating a diet.  Denies any 

abdominal pain.





Objective





- Vital Signs


Vital signs: 


                                   Vital Signs











Temp  97.6 F   09/13/24 04:00


 


Pulse  55 L  09/13/24 14:00


 


Resp  16   09/13/24 14:00


 


BP  93/59   09/13/24 12:00


 


Pulse Ox  93 L  09/13/24 12:00


 


FiO2      








                                 Intake & Output











 09/12/24 09/13/24 09/13/24





 18:59 06:59 18:59


 


Intake Total 1335.588 96.211 


 


Output Total  400 325


 


Balance 1335.588 -303.789 -325


 


Weight  78.5 kg 


 


Intake:   


 


  Intake, IV Titration 195.588 96.211 





  Amount   


 


    Heparin Sod,Pork in 0.45%  96.211 





    NaCl 25,000 unit In 0.45   





    % NaCl 1 250ml.bag @ 12   





    UNITS/KG/HR 9.204 mls/hr   





    IV .Q24H [facility] Rx#:   





    [id number]   


 


    Heparin Sod,Pork in 0.45% 195.588  





    NaCl 25,000 unit In 0.45   





    % NaCl 1 250ml.bag @ 12   





    UNITS/KG/HR 9.253 mls/hr   





    IV .Q24H [facility] Rx#:   





    [id number]   


 


  Oral 1140  


 


Output:   


 


  Urine  400 325


 


Other:   


 


  # Voids 2 1 














- Exam








Physical exam:


HEENT: Normocephalic, sclerae nonicteric


Chest: Clear to auscultation


Heart: Regular rate and rhythm


Abdomen: [Nontender, nondistended]


Extremities: No edema


Neuro: Alert and oriented


gen: nad


cv: rrr


pul: non labored breathing


abd: soft, non tender to palpation, no guarding or rebound tenderness





- Labs


CBC & Chem 7: 


                                 09/13/24 03:59





                                 09/13/24 08:00


Labs: 


                  Abnormal Lab Results - Last 24 Hours (Table)











  09/12/24 09/12/24 09/12/24 Range/Units





  16:42 16:42 21:56 


 


Neutrophils #  8.1 H    (1.3-7.7)  k/uL


 


APTT   50.6 H  43.3 H  (22.0-30.0)  sec


 


Carbon Dioxide     (22-30)  mmol/L


 


BUN     (9-20)  mg/dL


 


Glucose     (74-99)  mg/dL














  09/13/24 09/13/24 09/13/24 Range/Units





  03:59 03:59 08:00 


 


Neutrophils #     (1.3-7.7)  k/uL


 


APTT   39.4 H   (22.0-30.0)  sec


 


Carbon Dioxide  34 H   35 H  (22-30)  mmol/L


 


BUN  8 L   8 L  (9-20)  mg/dL


 


Glucose  114 H   105 H  (74-99)  mg/dL














Assessment and Plan


Assessment: 





69-year-old male with resolving appendicitis secondary to antibiotics


patient is tolerating a diet and and appears to be doing very well according to 

the Padilla trial patients will do well with antibiotics but 30% may return with 

appendicitis we will follow with him outpatient.





Time with Patient: Less than 30

## 2024-09-14 LAB
ANION GAP SERPL CALC-SCNC: 4 MMOL/L
BUN SERPL-SCNC: 12 MG/DL (ref 9–20)
CALCIUM SPEC-MCNC: 9 MG/DL (ref 8.4–10.2)
CHLORIDE SERPL-SCNC: 100 MMOL/L (ref 98–107)
CO2 SERPL-SCNC: 35 MMOL/L (ref 22–30)
GLUCOSE SERPL-MCNC: 100 MG/DL (ref 74–99)
MAGNESIUM SPEC-SCNC: 1.9 MG/DL (ref 1.6–2.3)
POTASSIUM SERPL-SCNC: 3.8 MMOL/L (ref 3.5–5.1)
SODIUM SERPL-SCNC: 139 MMOL/L (ref 137–145)

## 2024-09-14 RX ADMIN — AMIODARONE HYDROCHLORIDE SCH: 200 TABLET ORAL at 21:42

## 2024-09-14 NOTE — P.PN
Subjective


Progress Note Date: 09/14/24





The patient was seen and evaluated this morning with he is completely 

asymptomatic and hemodynamically stable and he would like to go home.  The 

stress test results still pending.  Currently is not on any anticoagulation.  

The physical examination is remarkable for regular rhythm with soft systolic 

murmur and clear breathing sounds bilaterally and no edema was noted in the 

lower extremities





Assessment


Atrial flutter patient currently is in normal sinus mechanism


History of paroxysmal atrial fibrillation


Coronary artery disease


Appendicitis


Multiple comorbid conditions





Plan


Follow-up with the results of the stress test


Further recommendation to follow





Objective





- Vital Signs


Vital signs: 


                                   Vital Signs











Temp  98 F   09/14/24 03:28


 


Pulse  68   09/14/24 08:25


 


Resp  16   09/14/24 03:28


 


BP  107/61   09/14/24 03:28


 


Pulse Ox  92 L  09/14/24 03:28


 


FiO2      








                                 Intake & Output











 09/13/24 09/14/24 09/14/24





 18:59 06:59 18:59


 


Output Total 1225  


 


Balance -1225  


 


Weight  75.7 kg 


 


Output:   


 


  Urine 1225  


 


Other:   


 


  Voiding Method  Toilet 


 


  # Voids  0 














- Labs


CBC & Chem 7: 


                                 09/13/24 03:59





                                 09/14/24 07:30


Labs: 


                  Abnormal Lab Results - Last 24 Hours (Table)











  09/14/24 Range/Units





  07:30 


 


Carbon Dioxide  35 H  (22-30)  mmol/L


 


Glucose  100 H  (74-99)  mg/dL

## 2024-09-14 NOTE — NM
EXAMINATION TYPE: NM stress lexiscan cardiolite

 

DATE OF EXAM: 9/13/2024

 

COMPARISON: NONE

 

CLINICAL INDICATION: Male, 69 years old with history of elevated troponins;

 

TECHNIQUE:  After the intravenous administration of 10.8 mCi Tc 99m Sestamibi - Cardiolite resting SP
ECT images acquired 45 minutes post injection. 

 

The patient received 0.4mg Lexiscan, 25.9 mCi Tc 99m Sestamibi - Stress images obtained 30 minutes po
st injection 

 

FINDINGS:  

 

Review of stress and rest SPECT images demonstrates large area of fixed perfusion defect along the in
ferior wall. There is adjacent GI activity and the perfusion defect is larger on rest images. Finding
s favored to represent prominent diaphragmatic attenuation artifact. No distinct reversibility is see
n. Gated analysis shows mild global hypokinesis with an estimated left ventricular ejection fraction 
of 50 %.  TID is borderline at 1.19.

 

 

 

IMPRESSION:  

 

1. Large fixed area of perfusion defect involving the inferior wall. As the area is larger on rest, w
e favor prominent diaphragmatic attenuation artifact. Correlate for any history of old infarct.

2. Estimated LVEF is borderline diminished at 50%. Note that the 'transient ischemic dilatation ratio
' is also borderline at 1.19 (a TID of 1.2 may be considered abnormal and can be seen in the setting 
of global inducible ischemia). Further workup as clinically indicated.

3. No discrete reversibility on this exam.

 

 

 

 

[facility], Workstation [id number], 9/14/2024 3:27 PM

## 2024-09-14 NOTE — P.PN
Subjective


Progress Note Date: 09/14/24





Subjective: 


Patient seen and examined at the bedside.  No acute events overnight.  





All Systems reviewed and pertinent positives and negatives noted in HPI, all 

other symptoms are negative





Objective:





Vital signs reviewed. 





General: non toxic, no distress, appears at stated age, overweight


Derm: no unusual rashes/lesions, warm


Head: atraumatic, normocephalic, symmetric


Eyes: EOMI, no lid lag, anicteric sclera, pupils equal round reactive to light


ENT: Nose and ears atraumatic


Neck: No cervical lymphadenopathy, trachea midline, supple


Mouth: no lip lesion, mucus membranes moist


Cardiovascular: S1S2 regular, S3, no murmur, positive dorsalis pedis pulse 

bilateral, no edema


Lungs: Decreased breath sounds Left base,  no rhonchi, no rales, no accessory 

muscle use


Abdominal: soft,  nontender to palpation, no guarding


Ext: muscle strength 5 out of 5 in all 4 extremities grossly, no gross muscle at

rophy, no contractures,


Neuro:  CN II-XI grossly intact, no gross focal neuro deficits


Psych: Alert, oriented, appropriate affect





Data reviewed today: 





Labs: Sodium 139, potassium 3.8, bicarb 35, BUN 12, creatinine 0.5, glucose 100


Lexiscan stress test pending








Assessment and Plan: 


61-year-old male with PMH of CAD, A-fib on Xarelto, COPD, BPH, HTN presents to 

the ER for abnormal lab work.  Patient had an outpatient CT abdomen and pelvis 

on 9/4/2024 which showed prominent appendix thickness with mild inflammatory 

changes correlating for appendicitis, diverticulosis and saccular aneurysm 

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  His outpatient t

roponin I was also elevated.  Patient was admitted for appendicitis and elevated

troponin with concerns for ACS.  Cardiology consulted.  ACS ruled out. Surgery 

consulted.  No surgical intervention at this time patient to be managed 

medically.  Patient on IV antibiotics.  Nuclear stress test results pending.





#Acute appendicitis, abdominal pain resolved


Patient denies abdominal pain


Continue with Zosyn 3.375g IV TID. 


Surgery consulted; no surgical intervention at this time, patient to continue on

IV antibiotics with transition to oral antibiotics 


Continue with heart healthy diet





#Contraction alkalosis secondary to loop diuretic


Bicarb 35


Encourage oral fluid intake


Decrease Lasix to 40 mg p.o. daily


Continue monitor BMP





#Melena


Stool occult blood test is negative.


Patient continued to have black-colored stools.  C-scope 2015 showed sigmoid 

diverticulosis. Protonix 40 mg IV BID.


Monitor CBC.


Patient likely need outpatient C-scope for further investigation.





#A-fib with RVR, converted to sinus rhythm


#Troponin elevation, ACS unlikely


Troponin I trended flat at 0.076, 0.073, 0.089; stop trending


Echocardiogram shows LVEF at 40%.  Moderate LV systolic dysfunction with a 

dilated left ventricle.


Telemetry monitoring.


Cardiology consulted; continue amiodarone 400 mg p.o. twice daily and metoprolol

50 mg p.o. daily, Farxiga 10 mg daily, Lipitor 40 mg at bedtime


Continue heparin drip, hold Xarelto 


MPI stress test results pending





#Fatigue


Possibly related to above. 


TSH 1.54, hemoglobin 15.4





Chronic conditions:


CAD: Echo 2021 EF 50-55% with moderate concentric LVH, basal inferior LV 

hypokinesis. Hold ASA. Metoprolol 50 mg PO QD. Lipitor 40 mg PO QHS.  Farxiga 10

mg p.o. daily


COPD: Albuterol neb Q6H PRN SOB/wheezing. No in acute exacerbation.


BPH: Flomax 0.4 mg PO QD.


HTN: Imdur 30 mg PO QD (on hold).  Hold amlodipine 10 mg PO QD. Metoprolol as 

above.





F: None


E: Replete as needed


N: Heart healthy diet


A: Ambulatory





DVT ppx: Low-dose heparin drip


GI prophylaxis: IV Protonix 40 mg twice daily


Code Status: Full code





Anticipated discharge place: Pending clinical course


Anticipated discharge date: Pending clinical course





I have seen and evaluated the patient today.  Discussed with the resident and 

agree with the residents finding and plan as documented in the resident's note.


Lexiscan results show large fixed defect TID 1.19 borderline. No complaints. 

Discussed with patient and wife at bedside.


Will discuss results with Cardiology tomorrow.





Objective





- Vital Signs


Vital signs: 


                                   Vital Signs











Temp  98 F   09/14/24 03:28


 


Pulse  68   09/14/24 08:25


 


Resp  16   09/14/24 08:00


 


BP  107/61   09/14/24 03:28


 


Pulse Ox  92 L  09/14/24 03:28


 


FiO2      








                                 Intake & Output











 09/13/24 09/14/24 09/14/24





 18:59 06:59 18:59


 


Intake Total   237.661


 


Output Total 1225  


 


Balance -1225  237.661


 


Weight  75.7 kg 


 


Intake:   


 


  Intake, IV Titration   237.661





  Amount   


 


    Heparin Sod,Pork in 0.45%   237.661





    NaCl 25,000 unit In 0.45   





    % NaCl 1 250ml.bag @ 12   





    UNITS/KG/HR 9.204 mls/hr   





    IV .Q24H [facility] Rx#:   





    [id number]   


 


Output:   


 


  Urine 1225  


 


Other:   


 


  Voiding Method  Toilet Toilet


 


  # Voids  0 














- Labs


CBC & Chem 7: 


                                 09/13/24 03:59





                                 09/14/24 07:30


Labs: 


                  Abnormal Lab Results - Last 24 Hours (Table)











  09/14/24 Range/Units





  07:30 


 


Carbon Dioxide  35 H  (22-30)  mmol/L


 


Glucose  100 H  (74-99)  mg/dL

## 2024-09-14 NOTE — P.PN
Progress Note - Text


Progress Note Date: 09/14/24


NAEO.  Denies pain.  Denies N/V. Tolerating diet





VSS


General-NAD


Abdomen-soft, NTND





69-year-old male with resolving appendicitis secondary to antibiotics


-transition from IV abx to oral abx


-once transitioned, ok for discharge from General Surgery standpoint


-Regular Diet





Ajit Dempsey DO


[facility]


[phone]

## 2024-09-15 VITALS — RESPIRATION RATE: 18 BRPM | HEART RATE: 54 BPM | DIASTOLIC BLOOD PRESSURE: 72 MMHG | SYSTOLIC BLOOD PRESSURE: 114 MMHG

## 2024-09-15 VITALS — TEMPERATURE: 98.2 F

## 2024-09-15 LAB
ANION GAP SERPL CALC-SCNC: 9 MMOL/L
BASOPHILS # BLD AUTO: 0.1 K/UL (ref 0–0.2)
BASOPHILS NFR BLD AUTO: 1 %
BUN SERPL-SCNC: 16 MG/DL (ref 9–20)
CALCIUM SPEC-MCNC: 9.4 MG/DL (ref 8.4–10.2)
CHLORIDE SERPL-SCNC: 100 MMOL/L (ref 98–107)
CO2 SERPL-SCNC: 29 MMOL/L (ref 22–30)
EOSINOPHIL # BLD AUTO: 0.3 K/UL (ref 0–0.7)
EOSINOPHIL NFR BLD AUTO: 2 %
ERYTHROCYTE [DISTWIDTH] IN BLOOD BY AUTOMATED COUNT: 5.01 M/UL (ref 4.3–5.9)
ERYTHROCYTE [DISTWIDTH] IN BLOOD: 13.4 % (ref 11.5–15.5)
GLUCOSE SERPL-MCNC: 143 MG/DL (ref 74–99)
HCT VFR BLD AUTO: 47.3 % (ref 39–53)
HGB BLD-MCNC: 14.9 GM/DL (ref 13–17.5)
LYMPHOCYTES # SPEC AUTO: 1.2 K/UL (ref 1–4.8)
LYMPHOCYTES NFR SPEC AUTO: 11 %
MAGNESIUM SPEC-SCNC: 1.9 MG/DL (ref 1.6–2.3)
MCH RBC QN AUTO: 29.7 PG (ref 25–35)
MCHC RBC AUTO-ENTMCNC: 31.5 G/DL (ref 31–37)
MCV RBC AUTO: 94.3 FL (ref 80–100)
MONOCYTES # BLD AUTO: 0.8 K/UL (ref 0–1)
MONOCYTES NFR BLD AUTO: 7 %
NEUTROPHILS # BLD AUTO: 8.2 K/UL (ref 1.3–7.7)
NEUTROPHILS NFR BLD AUTO: 77 %
PLATELET # BLD AUTO: 367 K/UL (ref 150–450)
POTASSIUM SERPL-SCNC: 4.2 MMOL/L (ref 3.5–5.1)
SODIUM SERPL-SCNC: 138 MMOL/L (ref 137–145)
WBC # BLD AUTO: 10.6 K/UL (ref 3.8–10.6)

## 2024-09-15 RX ADMIN — FUROSEMIDE SCH MG: 40 TABLET ORAL at 09:40

## 2024-09-15 RX ADMIN — LOSARTAN POTASSIUM SCH MG: 25 TABLET, FILM COATED ORAL at 09:40

## 2024-09-15 RX ADMIN — AMIODARONE HYDROCHLORIDE SCH MG: 200 TABLET ORAL at 09:40

## 2024-09-15 RX ADMIN — METOPROLOL SUCCINATE SCH: 25 TABLET, EXTENDED RELEASE ORAL at 10:50

## 2024-09-15 RX ADMIN — ASPIRIN 81 MG CHEWABLE TABLET SCH MG: 81 TABLET CHEWABLE at 09:40

## 2024-09-15 NOTE — P.DS
Providers


Date of admission: 


09/11/24 14:53





Expected date of discharge: 09/15/24


Attending physician: 


Stephane Gallagher MD





Consults: 





                                        





09/09/24 15:21


Consult Physician Urgent 


   Consulting Provider: Jayson Mariee


   Consult Reason/Comments: Troponin elevation


   Do you want consulting provider notified?: Yes





09/09/24 15:56


Consult Physician Stat 


   Consulting Provider: Rosas Carver


   Consult Reason/Comments: Appendicitis on CT? Melena


   Do you want consulting provider notified?: Yes











Primary care physician: 


[facility] Course: 





69 year old M with PMH of CAD, AFib on Xarelto, COPD, BPH, HTN presents to the 

ED for abnormal lab work. He reports melenotic stools and diarrhea (> 8 

episodes/day) that started over Labor Day weekend. Symptoms were associated with

periumbillical pain. Symptoms lasted for 4 days. His PCP ordered a CT AP which 

showed prominent appendix thickness with mild inflammatory changes correlate for

appendicitis, diverticulosis and saccular aneurysm arising from the right 

internal iliac artery measuring 22 mm. He was started on Cipro and Flagyl which 

improved his symptoms. His symptoms spontaneously returned today. Wife reports 

fatigue and sleepiness which is out of character for him. He had lab work done 

today which resulted in a Troponin of 0.081 and was told to come to the ED. He 

denies any chest pain. In the ED he underwent extensive evaluation. /90, 

HR 76, T 97.4F, RR 16, 91% on RA. CBC, Coag panel, CMP significant for WBC 11.2,

INR 1.2, Na 134, glu 101, total protein 6.1. Troponin 0.076, EKG sinus rhythm 

with PVCs and Q wave in V1 and V2. CXR negative. KUB non specific bowel gas 

pattern. Patient is admitted for further workup and management.





Started on Zosyn for concerns of appendicitis. Evaluated by surgery and decision

made to complete course of antibiotics, avoiding surgery for now and outpatient 

follow up.





Troponins remained flat at 0.076, 0.073, 0.089. Cardiology consulted. Echo 

showed EF 40% with dilated RV. Patient went into A-Fib with RVR requiring 

aminodarone drip which was eventually transitioned to PO with Metoprolol 

titrated up. Stool occult negative, patient started on heparin drip. Started on 

Lasix IV for diuresis and eventually transitioned to PO. Underwent Lexiscan 

which showed large fixed defect and TID 1.19 which was borderline.





9/15 Patient was seen and examined. No complaints. Feeling well. Cardiology has 

cleared the patient for discharge. CBC and BMP significant for glu 143. Plans 

for discharge home on Losartan (lowered dose), Metoprolol (lowered dose), 

Amiodarone and 5 days of Moxifloxacin (to complete a total of 10 days 

antibiotics). Advised PCP follow up in 1-2 days, Dr. Mariee within 1 week and Dr. Carver within 1 week. Advised him the need for possible appendectomy in the 

future. Patient verbalized understanding of the plan.





General: Nontoxic, no distress, appears at stated age


Derm: Warm, dry


Head: Atraumatic, normocephalic, symmetric


Eyes: EOMI, no lid lag, anicteric sclera


Mouth: No lip lesion, mucus membranes moist


Cardiovascular: RRR. Normal S1 S2. No murmurs, rubs, gallops


Lungs: Decreased BS BL, no accessory muscle use


Abdominal: Soft, non distended, non tender to palpation


Ext: No gross muscle atrophy, no edema, no contractures


Psych: Alert, oriented, appropriate affect





Discharge Diagnosis:





Atrial fibrillation with RVR


Systolic CHF exacerbation


Acute appendicitis


Metabolic alkaosis like contraction alkaosis from forced diuresis


Troponin elevation


Fatigue


CAD


COPD


BPH


HTN





This complex discharge took 35 minutes to complete. 





Patient Condition at Discharge: Stable





Plan - Discharge Summary


Discharge Rx Participant: No


New Discharge Prescriptions: 


New


   Losartan [Cozaar] 25 mg PO DAILY #30 tab


   Moxifloxacin HCl [Avelox] 400 mg PO DAILY #5 tab


   Amiodarone [Cordarone] 200 mg PO DAILY #30 tab


   Metoprolol Succinate (ER) [Toprol XL] 25 mg PO DAILY #30 tab





Continue


   Tamsulosin HCl 0.4 mg PO DAILY


   Vitamin B Complex 1 cap PO DAILY


   Ubidecarenone [Co Q-10] 100 mg PO DAILY


   Glucosamine Sulfate 500 mg PO DAILY


   Aspirin 81 mg PO DAILY #90 chewable


   Nitroglycerin Sl Tabs [Nitrostat] 0.4 mg SUBLINGUAL Q5M PRN #25 tab


     PRN Reason: Chest Pain


   Latanoprost [Latanoprost 0.005%] 1 drop BOTH EYES HS


   Albuterol Nebulized [Ventolin Nebulized] 2.5 mg INHALATION RT-Q6H PRN


     PRN Reason: Shortness Of Breath


   Rivaroxaban [Xarelto] 20 mg PO DAILY


   Budesonide/Glycopyr/Formoterol [Breztri Aerosphere Inhaler] 2 puff INHALATION

RT-BID


   hydrOXYzine HCL [Atarax] 25 mg PO HS


   Atorvastatin Calcium [Lipitor] 40 mg PO HS


   Dapagliflozin Propanediol [Farxiga] 10 mg PO DAILY





Changed


   Furosemide [Lasix] 40 mg PO DAILY #0





Discontinued


   Losartan Potassium 100 mg PO DAILY


   Isosorbide Mononitrate [Isosorbide Mononitrate ER] 30 mg PO DAILY


   Metoprolol Succinate (ER) [Toprol Xl] 50 mg PO DAILY


   amLODIPine [Norvasc] 10 mg PO DAILY


   metroNIDAZOLE [Flagyl] 500 mg PO TID


   Ciprofloxacin HCl [Cipro] 500 mg PO Q12HR


Discharge Medication List





Tamsulosin HCl 0.4 mg PO DAILY 05/01/15 [History]


Ubidecarenone [Co Q-10] 100 mg PO DAILY 05/01/15 [History]


Vitamin B Complex 1 cap PO DAILY 05/01/15 [History]


Glucosamine Sulfate 500 mg PO DAILY 09/13/18 [History]


Albuterol Nebulized [Ventolin Nebulized] 2.5 mg INHALATION RT-Q6H PRN 04/13/21 

[History]


Aspirin 81 mg PO DAILY #90 chewable 04/14/21 [Rx]


Nitroglycerin Sl Tabs [Nitrostat] 0.4 mg SUBLINGUAL Q5M PRN #25 tab 04/14/21 

[Rx]


Atorvastatin Calcium [Lipitor] 40 mg PO HS 09/09/24 [History]


Budesonide/Glycopyr/Formoterol [Breztri Aerosphere Inhaler] 2 puff INHALATION 

RT-BID 09/09/24 [History]


Dapagliflozin Propanediol [Farxiga] 10 mg PO DAILY 09/09/24 [History]


Latanoprost [Latanoprost 0.005%] 1 drop BOTH EYES HS 09/09/24 [History]


Rivaroxaban [Xarelto] 20 mg PO DAILY 09/09/24 [History]


hydrOXYzine HCL [Atarax] 25 mg PO HS 09/09/24 [History]


Amiodarone [Cordarone] 200 mg PO DAILY #30 tab 09/15/24 [Rx]


Furosemide [Lasix] 40 mg PO DAILY #0 09/15/24 [Rx]


Losartan [Cozaar] 25 mg PO DAILY #30 tab 09/15/24 [Rx]


Metoprolol Succinate (ER) [Toprol XL] 25 mg PO DAILY #30 tab 09/15/24 [Rx]


Moxifloxacin HCl [Avelox] 400 mg PO DAILY #5 tab 09/15/24 [Rx]








Follow up Appointment(s)/Referral(s): 


Jayson Mariee MD [STAFF PHYSICIAN] - 1 Week


Brian Ramon MD [Primary Care Provider] - 1-2 days


Rosas Carver DO [Doctor of Osteopathic Medicine] - 1 Week


Activity/Diet/Wound Care/Special Instructions: 


Diet: Cardiac, 1.5 L fluid restriction daily


Discharge Disposition: HOME SELF-CARE

## 2024-09-15 NOTE — P.PN
Progress Note - Text


Progress Note Date: 09/15/24


NAEO.  Denies pain.  Denies N/V. Tolerating diet





VSS


General-NAD


Abdomen-soft, NTND





69-year-old male with resolving appendicitis secondary to antibiotics


-transition from IV abx to oral abx


-once transitioned, ok for discharge from General Surgery standpoint


-Regular Diet





Ajit Dempsey DO


[facility]


[phone]

## 2024-09-15 NOTE — P.PN
Subjective


Progress Note Date: 09/15/24





September 14, 2024 


the patient was seen and evaluated this morning with he is completely 

asymptomatic and hemodynamically stable and he would like to go home.  The 

stress test results still pending.  Currently is not on any anticoagulation.  

The physical examination is remarkable for regular rhythm with soft systolic 

murmur and clear breathing sounds bilaterally and no edema was noted in the 

lower extremities





September 15, 2024


The patient was seen and evaluated this morning and he remains asymptomatic with

no pain in the chest or shortness of breath or dizziness or lightheadedness or 

any feeling of heart racing or furthering.  He is bradycardic with heart rate in

the 50s with him going to decrease the dose of amiodarone and continue current 

dose of metoprolol.  Potentially he can be discharged home.  The myocardial 

perfusion imaging stress test came to be overall unremarkable with inferior 

fixed defect felt to be diaphragm attenuation and no reversibility seen.  With 

that being said the patient potentially can be discharged home and he would like

to go home.  Will stop the heparin and start the patient on oral 

anticoagulation.  Examination is remarkable for regular rhythm with a clear 

breathing sounds bilaterally and no edema was noted.





Assessment


Atrial flutter patient currently is in normal sinus mechanism


History of paroxysmal atrial fibrillation


Coronary artery disease


Appendicitis


Multiple comorbid conditions





Plan


DC heparin and start the patient on oral anticoagulation


Continue the rest of the current medical regimen


Decrease the dose of amiodarone


The patient can be discharged home





Objective





- Vital Signs


Vital signs: 


                                   Vital Signs











Temp  98.2 F   09/15/24 04:19


 


Pulse  56 L  09/15/24 08:36


 


Resp  14   09/15/24 04:19


 


BP  114/69   09/15/24 04:19


 


Pulse Ox  92 L  09/15/24 04:19


 


FiO2      








                                 Intake & Output











 09/14/24 09/15/24 09/15/24





 18:59 06:59 18:59


 


Intake Total 1371.661 349.979 


 


Output Total 800 200 


 


Balance 571.661 149.979 


 


Weight  74.5 kg 


 


Intake:   


 


  Intake, IV Titration 237.661 127.979 





  Amount   


 


    Heparin Sod,Pork in 0.45% 237.661 127.979 





    NaCl 25,000 unit In 0.45   





    % NaCl 1 250ml.bag @ 12   





    UNITS/KG/HR 9.204 mls/hr   





    IV .Q24H [facility] Rx#:   





    [id number]   


 


  Oral 1134 222 


 


Output:   


 


  Urine 800 200 


 


Other:   


 


  Voiding Method Toilet Toilet 


 


  # Voids  2 














- Labs


CBC & Chem 7: 


                                 09/13/24 03:59





                                 09/14/24 07:30


Labs: 


                  Abnormal Lab Results - Last 24 Hours (Table)











  09/14/24 09/15/24 Range/Units





  17:12 00:18 


 


APTT  43.2 H  49.6 H  (22.0-30.0)  sec

## 2024-11-12 ENCOUNTER — HOSPITAL ENCOUNTER (OUTPATIENT)
Dept: HOSPITAL 47 - LABWHC1 | Age: 69
Discharge: HOME | End: 2024-11-12
Attending: INTERNAL MEDICINE
Payer: MEDICARE

## 2024-11-12 DIAGNOSIS — E78.2: ICD-10-CM

## 2024-11-12 DIAGNOSIS — I48.0: Primary | ICD-10-CM

## 2024-11-12 LAB
ALBUMIN SERPL-MCNC: 4.1 G/DL (ref 3.8–4.9)
ALBUMIN/GLOB SERPL: 1.86 RATIO (ref 1.6–3.17)
ALP SERPL-CCNC: 108 U/L (ref 41–126)
ALT SERPL-CCNC: 18 U/L (ref 10–49)
ANION GAP SERPL CALC-SCNC: 9.3 MMOL/L (ref 4–12)
AST SERPL-CCNC: 34 U/L (ref 14–35)
BUN SERPL-SCNC: 26.9 MG/DL (ref 9–27)
BUN/CREAT SERPL: 20.69 RATIO (ref 12–20)
CALCIUM SPEC-MCNC: 9.6 MG/DL (ref 8.7–10.3)
CHLORIDE SERPL-SCNC: 101 MMOL/L (ref 96–109)
CHOLEST SERPL-MCNC: 149 MG/DL (ref 0–200)
CO2 SERPL-SCNC: 30.7 MMOL/L (ref 21.6–31.8)
GLOBULIN SER CALC-MCNC: 2.2 G/DL (ref 1.6–3.3)
GLUCOSE SERPL-MCNC: 111 MG/DL (ref 70–110)
HDLC SERPL-MCNC: 58.8 MG/DL (ref 40–60)
LDLC SERPL CALC-MCNC: 76.6 MG/DL (ref 0–131)
POTASSIUM SERPL-SCNC: 4.9 MMOL/L (ref 3.5–5.5)
PROT SERPL-MCNC: 6.3 G/DL (ref 6.2–8.2)
SODIUM SERPL-SCNC: 141 MMOL/L (ref 135–145)
TRIGL SERPL-MCNC: 68.1 MG/DL (ref 0–149)
VLDLC SERPL CALC-MCNC: 13.62 MG/DL (ref 5–40)

## 2024-11-12 PROCEDURE — 80053 COMPREHEN METABOLIC PANEL: CPT

## 2024-11-12 PROCEDURE — 36415 COLL VENOUS BLD VENIPUNCTURE: CPT

## 2024-11-12 PROCEDURE — 80061 LIPID PANEL: CPT

## 2024-11-12 PROCEDURE — 84443 ASSAY THYROID STIM HORMONE: CPT

## 2024-12-20 ENCOUNTER — HOSPITAL ENCOUNTER (EMERGENCY)
Dept: HOSPITAL 47 - EC | Age: 69
Discharge: HOME | End: 2024-12-20
Payer: MEDICARE

## 2024-12-20 VITALS — DIASTOLIC BLOOD PRESSURE: 98 MMHG | SYSTOLIC BLOOD PRESSURE: 130 MMHG | HEART RATE: 78 BPM | RESPIRATION RATE: 18 BRPM

## 2024-12-20 VITALS — TEMPERATURE: 97.7 F

## 2024-12-20 DIAGNOSIS — Z88.6: ICD-10-CM

## 2024-12-20 DIAGNOSIS — M79.81: ICD-10-CM
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
## 2024-12-20 DIAGNOSIS — Z87.891: ICD-10-CM

## 2024-12-20 DIAGNOSIS — S22.31XA: Primary | ICD-10-CM

## 2024-12-20 DIAGNOSIS — Z86.73: ICD-10-CM

## 2024-12-20 LAB
ALBUMIN SERPL-MCNC: 4.3 G/DL (ref 3.5–5)
ALP SERPL-CCNC: 102 U/L (ref 38–126)
ALT SERPL-CCNC: 36 U/L (ref 4–49)
AMYLASE SERPL-CCNC: 47 U/L (ref 30–110)
ANION GAP SERPL CALC-SCNC: 4 MMOL/L
APTT BLD: 29.6 SEC (ref 22–30)
AST SERPL-CCNC: 48 U/L (ref 17–59)
BASOPHILS # BLD AUTO: 0.1 K/UL (ref 0–0.2)
BASOPHILS NFR BLD AUTO: 1 %
BUN SERPL-SCNC: 22 MG/DL (ref 9–20)
CALCIUM SPEC-MCNC: 9.5 MG/DL (ref 8.4–10.2)
CHLORIDE SERPL-SCNC: 102 MMOL/L (ref 98–107)
CO2 SERPL-SCNC: 31 MMOL/L (ref 22–30)
EOSINOPHIL # BLD AUTO: 0.2 K/UL (ref 0–0.7)
EOSINOPHIL NFR BLD AUTO: 2 %
ERYTHROCYTE [DISTWIDTH] IN BLOOD BY AUTOMATED COUNT: 5.16 M/UL (ref 4.3–5.9)
ERYTHROCYTE [DISTWIDTH] IN BLOOD: 13.9 % (ref 11.5–15.5)
GLUCOSE SERPL-MCNC: 107 MG/DL (ref 74–99)
GLUCOSE UR QL: (no result)
HCT VFR BLD AUTO: 48.6 % (ref 39–53)
HGB BLD-MCNC: 15.5 GM/DL (ref 13–17.5)
INR PPP: 1.2 (ref ?–1.2)
LIPASE SERPL-CCNC: 118 U/L (ref 23–300)
LYMPHOCYTES # SPEC AUTO: 1.1 K/UL (ref 1–4.8)
LYMPHOCYTES NFR SPEC AUTO: 12 %
MCH RBC QN AUTO: 30.1 PG (ref 25–35)
MCHC RBC AUTO-ENTMCNC: 31.9 G/DL (ref 31–37)
MCV RBC AUTO: 94.3 FL (ref 80–100)
MONOCYTES # BLD AUTO: 0.7 K/UL (ref 0–1)
MONOCYTES NFR BLD AUTO: 8 %
NEUTROPHILS # BLD AUTO: 7 K/UL (ref 1.3–7.7)
NEUTROPHILS NFR BLD AUTO: 75 %
PH UR: 7 [PH] (ref 5–8)
PLATELET # BLD AUTO: 257 K/UL (ref 150–450)
POTASSIUM SERPL-SCNC: 4.6 MMOL/L (ref 3.5–5.1)
PROT SERPL-MCNC: 6.8 G/DL (ref 6.3–8.2)
PT BLD: 12.5 SEC (ref 10–12.5)
RBC UR QL: 9 /HPF (ref 0–5)
SODIUM SERPL-SCNC: 137 MMOL/L (ref 137–145)
SP GR UR: 1.03 (ref 1–1.03)
UROBILINOGEN UR QL STRIP: <2 MG/DL (ref ?–2)
WBC # BLD AUTO: 9.3 K/UL (ref 3.8–10.6)
WBC #/AREA URNS HPF: <1 /HPF (ref 0–5)

## 2024-12-20 PROCEDURE — 85610 PROTHROMBIN TIME: CPT

## 2024-12-20 PROCEDURE — 83605 ASSAY OF LACTIC ACID: CPT

## 2024-12-20 PROCEDURE — 85025 COMPLETE CBC W/AUTO DIFF WBC: CPT

## 2024-12-20 PROCEDURE — 36415 COLL VENOUS BLD VENIPUNCTURE: CPT

## 2024-12-20 PROCEDURE — 71260 CT THORAX DX C+: CPT

## 2024-12-20 PROCEDURE — 85730 THROMBOPLASTIN TIME PARTIAL: CPT

## 2024-12-20 PROCEDURE — 96374 THER/PROPH/DIAG INJ IV PUSH: CPT

## 2024-12-20 PROCEDURE — 81001 URINALYSIS AUTO W/SCOPE: CPT

## 2024-12-20 PROCEDURE — 74018 RADEX ABDOMEN 1 VIEW: CPT

## 2024-12-20 PROCEDURE — 96375 TX/PRO/DX INJ NEW DRUG ADDON: CPT

## 2024-12-20 PROCEDURE — 99284 EMERGENCY DEPT VISIT MOD MDM: CPT

## 2024-12-20 PROCEDURE — 83690 ASSAY OF LIPASE: CPT

## 2024-12-20 PROCEDURE — 80053 COMPREHEN METABOLIC PANEL: CPT

## 2024-12-20 PROCEDURE — 74177 CT ABD & PELVIS W/CONTRAST: CPT

## 2024-12-20 PROCEDURE — 96361 HYDRATE IV INFUSION ADD-ON: CPT

## 2024-12-20 PROCEDURE — 82150 ASSAY OF AMYLASE: CPT

## 2024-12-20 RX ADMIN — HYDROMORPHONE HYDROCHLORIDE STA MG: 1 INJECTION, SOLUTION INTRAMUSCULAR; INTRAVENOUS; SUBCUTANEOUS at 07:43

## 2024-12-20 RX ADMIN — NICARDIPINE HYDROCHLORIDE STA MLS/HR: 2.5 INJECTION INTRAVENOUS at 07:44

## 2024-12-20 RX ADMIN — MAGESIUM CITRATE ONE ML: 1.75 LIQUID ORAL at 12:51

## 2024-12-20 NOTE — XR
EXAMINATION TYPE: XR KUB

 

DATE OF EXAM: 12/20/2024 8:10 AM

 

COMPARISON: None. 

 

CLINICAL INDICATION: Male, 69 years old with history of abdominal pain, 

 

TECHNIQUE: Single view of the abdomen.

 

FINDINGS:  

Small bowel demonstrates no evidence for dilatation or air fluid levels.  

 

Gas and fecal material is seen in non-distended colon.  

 

No convincing evidence for pneumoperitoneum.

 

 No unusual calcifications. 

 

The lung bases are clear. 

 

The osseous structures are intact.

 

IMPRESSION:  

 

1.  Overall nonobstructive bowel gas pattern. Moderate fecal stasis noted.

 

[facility], Workstation: [id number], 12/20/2024 8:14 AM

## 2024-12-20 NOTE — ED
General Adult HPI





- General


Chief complaint: Abdominal Pain


Stated complaint: Abdominal Pain


Time Seen by Provider: 12/20/24 07:20


Source: patient, family, RN notes reviewed, old records reviewed


Mode of arrival: wheelchair


Limitations: no limitations





- History of Present Illness


Initial comments: 





69-year-old male who presents to the emergency department stating that [place] 

[place] and it landed on top of him

.  Patient states it sat on top and for about 2 hours before he got help and the

next day he was sore and his leg was bruised up.  Patient states he did not 

really think much of it and yesterday all of a sudden he started having left-

sided flank pain is gotten progressively worse since then and now any movement 

at all hurts quite a bit.  Patient states it is more in the abdomen than in the 

ribs.  Patient states he has had no fever chills.  Patient denies vomiting or 

diarrhea.  Patient denies any numbness weakness patient has any other symptoms 

at this time.





- Related Data


                                Home Medications











 Medication  Instructions  Recorded  Confirmed


 


Tamsulosin HCl 0.4 mg PO DAILY 05/01/15 09/09/24


 


Ubidecarenone [Co Q-10] 100 mg PO DAILY 05/01/15 09/09/24


 


Vitamin B Complex 1 cap PO DAILY 05/01/15 09/09/24


 


Glucosamine Sulfate 500 mg PO DAILY 09/13/18 09/09/24


 


Albuterol Nebulized [Ventolin 2.5 mg INHALATION RT-Q6H PRN 04/13/21 09/09/24





Nebulized]   


 


Atorvastatin Calcium [Lipitor] 40 mg PO HS 09/09/24 09/09/24


 


Budesonide/Glycopyr/Formoterol 2 puff INHALATION RT-BID 09/09/24 09/09/24





[Breztri Aerosphere Inhaler]   


 


Dapagliflozin Propanediol [Farxiga] 10 mg PO DAILY 09/09/24 09/09/24


 


Latanoprost [Latanoprost 0.005%] 1 drop BOTH EYES HS 09/09/24 09/09/24


 


Rivaroxaban [Xarelto] 20 mg PO DAILY 09/09/24 09/09/24


 


hydrOXYzine HCL [Atarax] 25 mg PO HS 09/09/24 09/09/24








                                  Previous Rx's











 Medication  Instructions  Recorded


 


Aspirin 81 mg PO DAILY #90 chewable 04/14/21


 


Nitroglycerin Sl Tabs [Nitrostat] 0.4 mg SUBLINGUAL Q5M PRN #25 tab 04/14/21


 


Amiodarone [Cordarone] 200 mg PO DAILY #30 tab 09/15/24


 


Furosemide [Lasix] 40 mg PO DAILY #0 09/15/24


 


Losartan [Cozaar] 25 mg PO DAILY #30 tab 09/15/24


 


Metoprolol Succinate (ER) [Toprol 25 mg PO DAILY #30 tab 09/15/24





XL]  


 


Moxifloxacin HCl [Avelox] 400 mg PO DAILY #5 tab 09/15/24











                                    Allergies











Allergy/AdvReac Type Severity Reaction Status Date / Time


 


naproxen sodium [From Aleve] Allergy  Rash/Hives Verified 12/20/24 07:17














Review of Systems


ROS Statement: 


Those systems with pertinent positive or pertinent negative responses have been 

documented in the HPI.





ROS Other: All systems not noted in ROS Statement are negative.





Past Medical History


Past Medical History: Coronary Artery Disease (CAD), CVA/TIA, Hyperlipidemia, 

Hypertension, Myocardial Infarction (MI)


Additional Past Medical History / Comment(s): AUG 28, 14.


Last Myocardial Infarction Date:: UNKNOWN


History of Any Multi-Drug Resistant Organisms: None Reported


Past Surgical History: Tonsillectomy


Additional Past Surgical History / Comment(s): ventral hernia repair


Past Anesthesia/Blood Transfusion Reactions: No Reported Reaction


Past Psychological History: No Psychological Hx Reported


Smoking Status: Former smoker


Past Alcohol Use History: None Reported


Past Drug Use History: None Reported





- Past Family History


  ** Mother


Family Medical History: Deep Vein Thrombosis (DVT)





General Exam





- General Exam Comments


Initial Comments: 





GENERAL:


Patient is well-developed and well-nourished.  Patient is nontoxic and well-

hydrated and is in no acute distress.





ENT:


Neck is soft and supple.  No significant lymphadenopathy is noted.  Oropharynx 

is clear.  Moist mucous membranes.  Neck has full range of motion without 

eliciting any pain.  





EYES:


The sclera were anicteric and conjunctiva were pink and moist.  Extraocular 

movements were intact and pupils were equal round and reactive to light.  

Eyelids were unremarkable.





PULMONARY:


Unlabored respirations.  Good breath sounds bilaterally.  No audible rales 

rhonchi or wheezing was noted.





CARDIOVASCULAR:


There is a regular rate and rhythm without any murmurs gallops or rubs. 





ABDOMEN:


Patient's abdomen is slightly distended and extremely tender along the whole 

left flank.





SKIN:


Skin is clear with no lesions or rashes and otherwise unremarkable.





NEUROLOGIC:


Patient is alert and oriented x3.  Cranial nerves II through XII are grossly 

intact.  Motor and sensory are also intact.  Normal speech, volume and content. 

Symmetrical smile.  





MUSCULOSKELETAL:


Normal extremities with adequate strength and full range of motion.  





LYMPHATICS:


No significant lymphadenopathy is noted





PSYCHIATRIC:


Normal psychiatric evaluation.  


Limitations: no limitations





Course


                                   Vital Signs











  12/20/24





  07:17


 


Temperature 97.7 F


 


Pulse Rate 61


 


Respiratory 20





Rate 


 


Blood Pressure 179/107


 


O2 Sat by Pulse 92 L





Oximetry 














Medical Decision Making





- Medical Decision Making





Was pt. sent in by a medical professional or institution (LUPILLO Polanco, NP, urgent 

care, hospital, or nursing home...) When possible be specific


@ -No


Did you speak to anyone other than the patient for history (EMS, parent, family,

police, friend...)? What history was obtained from this source 


@ -No


Did you review nursing and triage notes (agree or disagree)? Why? 


@ -I reviewed and agree with nursing and triage notes


Were old charts reviewed (outside hosp., previous admission, EMS record, old 

EKG, old radiological studies, urgent care reports/EKG's, nursing home records)?

Report findings 


@ -No old charts were reviewed


Differential Diagnosis? 


@ -Rib fracture, pneumothorax, liver laceration, renal contusion, muscular 

strain, this is not an all-inclusive list


EKG interpreted by me (3pts min.).


@ -As above


X-rays interpreted by me (1pt min.).


@ -None done


CT interpreted by me (1pt min.).


@ -CT of the chest abdomen pelvis shows a right obturator hematoma and a right 

11th rib fracture.


U/S interpreted by me (1pt. min.).


@ -None done


What testing was considered but not performed or refused? (CT, X-rays, U/S, 

labs)? Why?


@ -None


What meds were considered but not given or refused? Why?


@ -None


Did you discuss the management of the patient with other professionals 

(professionals i.e. LUPILLO Polanco, NP, lab, RT, psych nurse, , , 

teacher, , )? Give summary


@ -No


Was smoking cessation discussed for >3mins.?


@ -No


Was critical care preformed (if so, how long)?


@ -No


Were there social determinants of health that impacted care today? How? 

(Homelessness, low income, unemployed, alcoholism, drug addiction, 

transportation, low edu. Level, literacy, decrease access to med. care, [facility], 

rehab)?


@ -No


Was there de-escalation of care discussed even if they declined (Discuss DNR or 

withdrawal of care, Hospice)? DNR status


@ -No


What co-morbidities impacted this encounter? (DM, HTN, Smoking, COPD, CAD, 

Cancer, CVA, ARF, Chemo, Hep., AIDS, mental health diagnosis, sleep apnea, 

morbid obesity)?


@ -None


Was patient admitted / discharged? Hospital course, mention meds given and 

route, prescriptions, significant lab abnormalities, going to OR and other 

pertinent info.


@ -Patient did not want to be hospitalized and stated he could deal with the 

pain at home at this time patient will be sent home with an incentive 

spirometer.  Patient also received Dilaudid in the emergency department and will

take Motrin Tylenol at home.


Undiagnosed new problem with uncertain prognosis?


@ -No


Drug Therapy requiring intensive monitoring for toxicity (Heparin, Nitro, 

Insulin, Cardizem)?


@ -No


Were any procedures done?


@ -No


Diagnosis/symptom?


@ -Obturator hematoma


Acute, or Chronic, or Acute on Chronic?


@ -Acute


Uncomplicated (without systemic symptoms) or Complicated (systemic symptoms)?


@ -Complicated


Side effects of treatment?


@ -No


Exacerbation, Progression, or Severe Exacerbation?


@ -No


Poses a threat to life or bodily function? How? (Chest pain, USA, MI, pneumonia,

PE, COPD, DKA, ARF, appy, cholecystitis, CVA, Diverticulitis, Homicidal, 

Suicidal, threat to staff... and all critical care pts)


@ -No


Diagnosis/symptom?


@ -Rib fracture


Acute, or Chronic, or Acute on Chronic?


@ -Acute


Uncomplicated (without systemic symptoms) or Complicated (systemic symptoms)?


@ -Complicated


Side effects of treatment?


@ -None


Exacerbation, Progression, or Severe Exacerbation]


@ -No


Poses a threat to life or bodily function?


@ -No





- Lab Data


Result diagrams: 


                                 12/20/24 07:52





                                 12/20/24 07:52


                                   Lab Results











  12/20/24 12/20/24 12/20/24 Range/Units





  07:52 07:52 07:52 


 


WBC  9.3    (3.8-10.6)  k/uL


 


RBC  5.16    (4.30-5.90)  m/uL


 


Hgb  15.5    (13.0-17.5)  gm/dL


 


Hct  48.6    (39.0-53.0)  %


 


MCV  94.3    (80.0-100.0)  fL


 


MCH  30.1    (25.0-35.0)  pg


 


MCHC  31.9    (31.0-37.0)  g/dL


 


RDW  13.9    (11.5-15.5)  %


 


Plt Count  257    (150-450)  k/uL


 


MPV  7.3    


 


Neutrophils %  75    %


 


Lymphocytes %  12    %


 


Monocytes %  8    %


 


Eosinophils %  2    %


 


Basophils %  1    %


 


Neutrophils #  7.0    (1.3-7.7)  k/uL


 


Lymphocytes #  1.1    (1.0-4.8)  k/uL


 


Monocytes #  0.7    (0-1.0)  k/uL


 


Eosinophils #  0.2    (0-0.7)  k/uL


 


Basophils #  0.1    (0-0.2)  k/uL


 


PT   12.5   (10.0-12.5)  sec


 


INR   1.2 H   (<1.2)  


 


APTT   29.6   (22.0-30.0)  sec


 


Sodium    137  (137-145)  mmol/L


 


Potassium    4.6  (3.5-5.1)  mmol/L


 


Chloride    102  ()  mmol/L


 


Carbon Dioxide    31 H  (22-30)  mmol/L


 


Anion Gap    4  mmol/L


 


BUN    22 H  (9-20)  mg/dL


 


Creatinine    0.91  (0.66-1.25)  mg/dL


 


Est GFR (CKD-EPI)AfAm    >90  (>60 ml/min/1.73 sqM)  


 


Est GFR (CKD-EPI)NonAf    86  (>60 ml/min/1.73 sqM)  


 


Glucose    107 H  (74-99)  mg/dL


 


Plasma Lactic Acid Mino     (0.7-2.0)  mmol/L


 


Calcium    9.5  (8.4-10.2)  mg/dL


 


Total Bilirubin    0.8  (0.2-1.3)  mg/dL


 


AST    48  (17-59)  U/L


 


ALT    36  (4-49)  U/L


 


Alkaline Phosphatase    102  ()  U/L


 


Total Protein    6.8  (6.3-8.2)  g/dL


 


Albumin    4.3  (3.5-5.0)  g/dL


 


Amylase    47  ()  U/L


 


Lipase    118  ()  U/L


 


Urine Color     


 


Urine Appearance     (Clear)  


 


Urine pH     (5.0-8.0)  


 


Ur Specific Gravity     (1.001-1.035)  


 


Urine Protein     (Negative)  


 


Urine Glucose (UA)     (Negative)  


 


Urine Ketones     (Negative)  


 


Urine Blood     (Negative)  


 


Urine Nitrite     (Negative)  


 


Urine Bilirubin     (Negative)  


 


Urine Urobilinogen     (<2.0)  mg/dL


 


Ur Leukocyte Esterase     (Negative)  


 


Urine RBC     (0-5)  /hpf


 


Urine WBC     (0-5)  /hpf














  12/20/24 12/20/24 Range/Units





  07:52 10:12 


 


WBC    (3.8-10.6)  k/uL


 


RBC    (4.30-5.90)  m/uL


 


Hgb    (13.0-17.5)  gm/dL


 


Hct    (39.0-53.0)  %


 


MCV    (80.0-100.0)  fL


 


MCH    (25.0-35.0)  pg


 


MCHC    (31.0-37.0)  g/dL


 


RDW    (11.5-15.5)  %


 


Plt Count    (150-450)  k/uL


 


MPV    


 


Neutrophils %    %


 


Lymphocytes %    %


 


Monocytes %    %


 


Eosinophils %    %


 


Basophils %    %


 


Neutrophils #    (1.3-7.7)  k/uL


 


Lymphocytes #    (1.0-4.8)  k/uL


 


Monocytes #    (0-1.0)  k/uL


 


Eosinophils #    (0-0.7)  k/uL


 


Basophils #    (0-0.2)  k/uL


 


PT    (10.0-12.5)  sec


 


INR    (<1.2)  


 


APTT    (22.0-30.0)  sec


 


Sodium    (137-145)  mmol/L


 


Potassium    (3.5-5.1)  mmol/L


 


Chloride    ()  mmol/L


 


Carbon Dioxide    (22-30)  mmol/L


 


Anion Gap    mmol/L


 


BUN    (9-20)  mg/dL


 


Creatinine    (0.66-1.25)  mg/dL


 


Est GFR (CKD-EPI)AfAm    (>60 ml/min/1.73 sqM)  


 


Est GFR (CKD-EPI)NonAf    (>60 ml/min/1.73 sqM)  


 


Glucose    (74-99)  mg/dL


 


Plasma Lactic Acid Mino  0.9   (0.7-2.0)  mmol/L


 


Calcium    (8.4-10.2)  mg/dL


 


Total Bilirubin    (0.2-1.3)  mg/dL


 


AST    (17-59)  U/L


 


ALT    (4-49)  U/L


 


Alkaline Phosphatase    ()  U/L


 


Total Protein    (6.3-8.2)  g/dL


 


Albumin    (3.5-5.0)  g/dL


 


Amylase    ()  U/L


 


Lipase    ()  U/L


 


Urine Color   Colorless  


 


Urine Appearance   Clear  (Clear)  


 


Urine pH   7.0  (5.0-8.0)  


 


Ur Specific Gravity   1.034  (1.001-1.035)  


 


Urine Protein   Negative  (Negative)  


 


Urine Glucose (UA)   4+ H  (Negative)  


 


Urine Ketones   Negative  (Negative)  


 


Urine Blood   Small H  (Negative)  


 


Urine Nitrite   Negative  (Negative)  


 


Urine Bilirubin   Negative  (Negative)  


 


Urine Urobilinogen   <2.0  (<2.0)  mg/dL


 


Ur Leukocyte Esterase   Negative  (Negative)  


 


Urine RBC   9 H  (0-5)  /hpf


 


Urine WBC   <1  (0-5)  /hpf














Disposition


Clinical Impression: 


 Hematoma of muscle, Fracture, rib





Disposition: HOME SELF-CARE


Condition: Good


Additional Instructions: 


Patient should hold his blood thinner for 5 days and then speak with his primary

medical care doctor about when to continue





Patient should take Motrin or Tylenol as needed for pain if he has any 

difficulty breathing or symptoms worsen or if pain is uncontrollable he should 

return to the emergency department


Is patient prescribed a controlled substance at d/c from ED?: No


Referrals: 


Brian Ramon MD [Primary Care Provider] - 1-2 days


Time of Disposition: 11:51

## 2024-12-20 NOTE — CT
EXAMINATION TYPE: CT ChestAbdPelvis w con

 

DATE OF EXAM: 12/20/2024 8:40 AM

 

COMPARISON: None. 

 

CLINICAL INDICATION: Male, 69 years old with history of Trauma, RIGHT SIDED PAIN pain

 

Technique: CT ChestAbdPelvis w con; Multiple axial images were obtained. Two-dimensional coronal and 
sagittal reconstructions were obtained. 

Contrast used:100 mL of Isovue 300 with IV Contrast, (None if empty)

Oral contrast used: without Oral Contrast 

CT DLP: 1192.9 mGycm, Automated exposure control for dose reduction was used.

 

Findings: 

 

CHEST:

LUNGS/ PLEURA: No focal consolidation, pneumothorax or pleural effusion. Moderate centrilobular and p
araseptal emphysema changes.

AIRWAY: Patent and unremarkable.

HEART: Size within normal limits.

MEDIASTINUM: No gross evidence of adenopathy.

VASCULATURE:  No aortic aneurysm. 

MUSCULOSKELETAL: Right rib 11 fracture series 204 image 61, right rib

SOFT TISSUES/LYMPH NODES: Unremarkable.

LOWER NECK: No significant findings.

 

ABDOMEN:

ABDOMEN

LIVER: Unremarkable

GALLBLADDER AND BILE DUCTS: Unremarkable.

PANCREAS: Unremarkable.

SPLEEN: Unremarkable.

ADRENAL GLANDS: Unremarkable.

KIDNEYS AND URETERS: No evidence of hydronephrosis or renal calculus. The ureters are unremarkable.  


 

PELVIS

BLADDER: Unremarkable

REPRODUCTIVE: Unremarkable.

 

ABDOMEN & PELVIS

STOMACH AND BOWEL: No evidence of bowel obstruction. Gaseous distention of large bowel with moderate 
amount of stool. Colonic diverticula.r

PERITONEUM/RETROPERITONEUM: No evidence of pneumoperitoneum or free fluid.

VASCULATURE: Infrarenal abdominal aortic aneurysm measuring up to 4.7 x 4.1 cm. Saccular aneurysm of 
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

MUSCULOSKELETAL: No acute osseous abnormalities, grade 2 anterolisthesis of L5 on S1 with bilateral s
pondylolysis. Subchondral sclerosis of the femoral heads without without collapse. Asymmetric enlarge
ment of the right obturator internus muscle series 201 image 107.

LYMPH NODES: No gross evidence for lymphadenopathy.

SOFT TISSUE/ABDOMINAL WALL: Fatty changes in the inguinal canals with probable small left fat-contain
ing inguinal hernia.

 

IMPRESSION: 

1.  Acute minimally displaced right rib 11 fracture.

2.  Infrarenal abdominal aortic aneurysm measuring up to 4.7 x 4.1 cm. 

3.  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 

4.  Extensive Colonic diverticulosis in the sigmoid colon possibly contributing to upstream gaseous d
ilation of bowel and stool..

5.  Grade 2 anterolisthesis of L5 on S1 with bilateral spondylolysis.

6.  Subchondral sclerosis of the bilateral femoral heads suggestive of avascular necrosis. No evidenc
e for subchondral collapse.

7.  Asymmetric thickening of the right obturator internus muscle further evaluation should be conside
red with MRI with IV contrast of the pelvis.

 

[facility], Workstation: [id number], 12/20/2024 9:08 AM

## 2025-01-24 ENCOUNTER — HOSPITAL ENCOUNTER (OUTPATIENT)
Dept: HOSPITAL 47 - LABWHC1 | Age: 70
Discharge: HOME | End: 2025-01-24
Attending: NURSE PRACTITIONER
Payer: MEDICARE

## 2025-01-24 DIAGNOSIS — I10: Primary | ICD-10-CM

## 2025-01-24 LAB
ANION GAP SERPL CALC-SCNC: 8.9 MMOL/L (ref 4–12)
BUN SERPL-SCNC: 25.5 MG/DL (ref 9–27)
BUN/CREAT SERPL: 21.25 RATIO (ref 12–20)
CALCIUM SPEC-MCNC: 9.5 MG/DL (ref 8.7–10.3)
CHLORIDE SERPL-SCNC: 93 MMOL/L (ref 96–109)
CO2 SERPL-SCNC: 29.1 MMOL/L (ref 21.6–31.8)
GLUCOSE SERPL-MCNC: 105 MG/DL (ref 70–110)
POTASSIUM SERPL-SCNC: 5.4 MMOL/L (ref 3.5–5.5)
SODIUM SERPL-SCNC: 131 MMOL/L (ref 135–145)

## 2025-01-24 PROCEDURE — 36415 COLL VENOUS BLD VENIPUNCTURE: CPT

## 2025-01-24 PROCEDURE — 80048 BASIC METABOLIC PNL TOTAL CA: CPT

## 2025-04-03 ENCOUNTER — HOSPITAL ENCOUNTER (OUTPATIENT)
Dept: HOSPITAL 47 - LABWHC1 | Age: 70
Discharge: HOME | End: 2025-04-03
Attending: INTERNAL MEDICINE
Payer: MEDICARE

## 2025-04-03 DIAGNOSIS — E78.2: ICD-10-CM

## 2025-04-03 DIAGNOSIS — I10: Primary | ICD-10-CM

## 2025-04-03 LAB
ALBUMIN SERPL-MCNC: 4.1 G/DL (ref 3.8–4.9)
ALBUMIN/GLOB SERPL: 1.64 RATIO (ref 1.6–3.17)
ALP SERPL-CCNC: 102 U/L (ref 41–126)
ALT SERPL-CCNC: 20 U/L (ref 10–49)
AST SERPL-CCNC: 24 U/L (ref 14–35)
BUN SERPL-SCNC: 28.8 MG/DL (ref 9–27)
BUN/CREAT SERPL: 22.15 RATIO (ref 12–20)
CALCIUM SPEC-MCNC: 9.6 MG/DL (ref 8.7–10.3)
CHLORIDE SERPL-SCNC: 103 MMOL/L (ref 96–109)
CO2 SERPL-SCNC: 26.8 MMOL/L (ref 21.6–31.8)
GLOBULIN SER CALC-MCNC: 2.5 G/DL (ref 1.6–3.3)
GLUCOSE SERPL-MCNC: 127 MG/DL (ref 70–110)
LDLC SERPL CALC-MCNC: 60.3 MG/DL (ref 0–131)
POTASSIUM SERPL-SCNC: 5.2 MMOL/L (ref 3.5–5.5)
PROT SERPL-MCNC: 6.6 G/DL (ref 6.2–8.2)
SODIUM SERPL-SCNC: 139 MMOL/L (ref 135–145)
VLDLC SERPL CALC-MCNC: 13.68 MG/DL (ref 5–40)

## 2025-04-03 PROCEDURE — 80061 LIPID PANEL: CPT

## 2025-04-03 PROCEDURE — 80053 COMPREHEN METABOLIC PANEL: CPT

## 2025-04-03 PROCEDURE — 36415 COLL VENOUS BLD VENIPUNCTURE: CPT

## 2025-06-09 ENCOUNTER — HOSPITAL ENCOUNTER (OUTPATIENT)
Dept: HOSPITAL 47 - CATHEP | Age: 70
LOS: 1 days | Discharge: HOME | End: 2025-06-10
Attending: INTERNAL MEDICINE
Payer: MEDICARE

## 2025-06-09 DIAGNOSIS — I10: ICD-10-CM

## 2025-06-09 DIAGNOSIS — E78.5: ICD-10-CM

## 2025-06-09 DIAGNOSIS — I48.3: ICD-10-CM

## 2025-06-09 DIAGNOSIS — I25.10: ICD-10-CM

## 2025-06-09 DIAGNOSIS — Z79.82: ICD-10-CM

## 2025-06-09 DIAGNOSIS — Z86.73: ICD-10-CM

## 2025-06-09 DIAGNOSIS — Z79.01: ICD-10-CM

## 2025-06-09 DIAGNOSIS — Z79.84: ICD-10-CM

## 2025-06-09 DIAGNOSIS — I48.0: Primary | ICD-10-CM

## 2025-06-09 DIAGNOSIS — I25.2: ICD-10-CM

## 2025-06-09 DIAGNOSIS — Z79.899: ICD-10-CM

## 2025-06-09 DIAGNOSIS — Z87.891: ICD-10-CM

## 2025-06-09 DIAGNOSIS — Z95.5: ICD-10-CM

## 2025-06-09 DIAGNOSIS — I49.5: ICD-10-CM

## 2025-06-09 DIAGNOSIS — I25.5: ICD-10-CM

## 2025-06-09 DIAGNOSIS — J44.9: ICD-10-CM

## 2025-06-09 LAB
ACANTHOCYTES BLD QL SMEAR: (no result)
AGRAN PLATELETS BLD QL SMEAR: (no result)
ALBUMIN SERPL-MCNC: 3.4 G/DL (ref 3.5–5)
ALBUMIN/GLOB SERPL: 1.5 {RATIO}
ALP SERPL-CCNC: 66 U/L (ref 38–126)
ALT SERPL-CCNC: 27 U/L (ref 4–49)
ANION GAP SERPL CALC-SCNC: 5 MMOL/L
ANION GAP SERPL CALC-SCNC: 6 MMOL/L
ANISOCYTOSIS BLD QL SMEAR: (no result)
ANISOCYTOSIS BLD QL: (no result)
AST SERPL-CCNC: 70 U/L (ref 17–59)
AUER BODIES BLD QL SMEAR: (no result)
BASO STIPL BLD QL SMEAR: (no result)
BASOPHILS # BLD AUTO: 0.04 10*3/UL (ref 0–0.1)
BASOPHILS # BLD MANUAL: (no result) K/UL (ref 0–0.2)
BASOPHILS NFR BLD AUTO: 0.3 %
BASOPHILS NFR SPEC MANUAL: (no result) %
BILIRUB BLD-MCNC: 0.8 MG/DL (ref 0.2–1.3)
BLASTS # BLD MANUAL: (no result) %
BLASTS # BLD MANUAL: (no result) K/UL
BUN SERPL-SCNC: 19 MG/DL (ref 9–20)
BUN SERPL-SCNC: 20 MG/DL (ref 9–20)
BUN/CREAT SERPL: (no result)
BURR CELLS BLD QL SMEAR: (no result)
BURR CELLS BLD QL SMEAR: (no result)
CALCIUM SPEC-MCNC: 8.2 MG/DL (ref 8.4–10.2)
CALCIUM SPEC-MCNC: 8.5 MG/DL (ref 8.4–10.2)
CELLS COUNTED: (no result)
CHLORIDE SERPL-SCNC: 101 MMOL/L (ref 98–107)
CHLORIDE SERPL-SCNC: 99 MMOL/L (ref 98–107)
CO2 SERPL-SCNC: 22 MMOL/L (ref 22–30)
CO2 SERPL-SCNC: 23 MMOL/L (ref 22–30)
CREATININE: 0.69 MG/DL (ref 0.66–1.25)
CREATININE: 0.82 MG/DL (ref 0.66–1.25)
DACRYOCYTES BLD QL SMEAR: (no result)
DOHLE BOD BLD QL SMEAR: (no result)
ELLIPTOCYTES BLD QL SMEAR: (no result)
EOSINOPHIL # BLD AUTO: 0.03 10*3/UL (ref 0.04–0.35)
EOSINOPHIL # BLD MANUAL: (no result) K/UL (ref 0–0.7)
EOSINOPHIL NFR BLD AUTO: 0.2 %
EOSINOPHIL NFR BLD MANUAL: (no result) %
ERYTHROCYTE [DISTWIDTH] IN BLOOD BY AUTOMATED COUNT: 4.64 10*6/UL (ref 4.4–5.6)
ERYTHROCYTE [DISTWIDTH] IN BLOOD: 13.3 % (ref 11.5–14.5)
GIANT PLATELETS BLD QL SMEAR: (no result)
GLOBULIN SER CALC-MCNC: 2.3 G/DL
GLUCOSE SERPL-MCNC: 100 MG/DL (ref 74–99)
GLUCOSE SERPL-MCNC: 103 MG/DL (ref 74–99)
HCT VFR BLD AUTO: 42.9 % (ref 39.6–50)
HELMET CELLS BLD QL SMEAR: (no result)
HGB BLD-MCNC: 14.5 G/DL (ref 13–17)
HOWELL-JOLLY BOD BLD QL SMEAR: (no result)
HYPERCHROMASIA: (no result)
HYPOCHROMIA BLD QL SMEAR: (no result)
HYPOCHROMIA BLD QL: (no result)
IMM GRANULOCYTES # BLD: 0.07 10*3/UL (ref 0–0.04)
IMM GRANULOCYTES BLD QL AUTO: (no result)
LG PLATELETS BLD QL SMEAR: (no result)
LYMPHOCYTES # BLD MANUAL: (no result) K/UL (ref 1–4.8)
LYMPHOCYTES # SPEC AUTO: 0.8 10*3/UL (ref 0.9–5)
LYMPHOCYTES NFR BLD MANUAL: (no result) %
LYMPHOCYTES NFR SPEC AUTO: 5.8 %
Lab: (no result)
Lab: (no result)
MACROCYTES BLD QL SMEAR: (no result)
MACROCYTES BLD QL: (no result)
MCH RBC QN AUTO: 31.3 PG (ref 27–32)
MCHC RBC AUTO-ENTMCNC: 33.8 G/DL (ref 32–37)
MCV RBC AUTO: 92.5 FL (ref 80–97)
METAMYELOCYTES # BLD: (no result) K/UL
METAMYELOCYTES NFR BLD MANUAL: (no result) %
MICROCYTES BLD QL SMEAR: (no result)
MICROCYTOSIS: (no result)
MONOCYTES # BLD AUTO: 1.34 10*3/UL (ref 0.2–1)
MONOCYTES # BLD MANUAL: (no result) K/UL (ref 0–1)
MONOCYTES NFR BLD AUTO: 9.7 %
MONOCYTES NFR BLD MANUAL: (no result) %
MYELOCYTES # BLD MANUAL: (no result) K/UL
MYELOCYTES NFR BLD MANUAL: (no result) %
NEUTROPHILS # BLD AUTO: 11.58 10*3/UL (ref 1.8–7.7)
NEUTROPHILS NFR BLD AUTO: 83.5 %
NEUTROPHILS NFR BLD MANUAL: (no result) %
NEUTS BAND # BLD MANUAL: (no result) K/UL
NEUTS BAND NFR BLD: (no result) %
NEUTS HYPERSEG # BLD: (no result) 10*3/UL
NEUTS SEG # BLD MANUAL: (no result) K/UL (ref 1.3–7.7)
NRBC # BLD: (no result) /100 WBC (ref 0–0)
NRBC BLD AUTO-RTO: (no result) %
OTHER CELLS NFR BLD MANUAL: (no result) %
OVALOCYTES BLD QL SMEAR: (no result)
PAPPENHEIMER BOD BLD QL SMEAR: (no result)
PARASITE [PRESENCE] IN BLOOD BY LIGHT MICROSCOPY: (no result)
PELGER HUET CELLS BLD QL SMEAR: (no result)
PLASMA CELLS # BLD MANUAL: (no result) %
PLASMA CELLS # BLD MANUAL: (no result) K/UL
PLATELET # BLD AUTO: 209 10*3/UL (ref 140–440)
PLATELETS.RETICULATED NFR BLD AUTO: (no result) %
PMV BLD AUTO: 9.2 FL (ref 9.5–12.2)
POIKILOCYTOSIS BLD QL SMEAR: (no result)
POIKILOCYTOSIS BLD QL SMEAR: (no result)
POIKILOCYTOSIS: (no result)
POLYCHROMASIA BLD QL SMEAR: (no result)
POTASSIUM SERPL-SCNC: 5.1 MMOL/L (ref 3.5–5.1)
POTASSIUM SERPL-SCNC: 5.3 MMOL/L (ref 3.5–5.1)
PROMYELOCYTES # BLD: (no result) K/UL
PROMYELOCYTES NFR BLD MANUAL: (no result) %
PROT SERPL-MCNC: 5.7 G/DL (ref 6.3–8.2)
RBC AGGLUTINATION: (no result)
RBC MORPH BLD: (no result)
ROULEAUX BLD QL SMEAR: (no result)
SCHISTOCYTES BLD QL SMEAR: (no result)
SCHISTOCYTES BLD QL SMEAR: (no result)
SICKLE CELLS BLD QL SMEAR: (no result)
SMUDGE CELLS BLD QL SMEAR: (no result)
SODIUM SERPL-SCNC: 127 MMOL/L (ref 137–145)
SODIUM SERPL-SCNC: 129 MMOL/L (ref 137–145)
SPHEROCYTES BLD QL SMEAR: (no result)
STOMATOCYTES BLD QL SMEAR: (no result)
TARGETS BLD QL SMEAR: (no result)
TOXIC GRANULES BLD QL SMEAR: (no result)
VARIANT LYMPHS BLD QL SMEAR: (no result)
VARIANT LYMPHS BLD QL SMEAR: (no result)
WBC # BLD AUTO: 13.86 10*3/UL (ref 4.5–10)
WBC NRBC COR # BLD: (no result) K/UL
WBC TOXIC VACUOLES BLD QL SMEAR: (no result)

## 2025-06-09 PROCEDURE — 85025 COMPLETE CBC W/AUTO DIFF WBC: CPT

## 2025-06-09 PROCEDURE — 86900 BLOOD TYPING SEROLOGIC ABO: CPT

## 2025-06-09 PROCEDURE — 93656 COMPRE EP EVAL ABLTJ ATR FIB: CPT

## 2025-06-09 PROCEDURE — 93005 ELECTROCARDIOGRAM TRACING: CPT

## 2025-06-09 PROCEDURE — 86850 RBC ANTIBODY SCREEN: CPT

## 2025-06-09 PROCEDURE — 94640 AIRWAY INHALATION TREATMENT: CPT

## 2025-06-09 PROCEDURE — 93623 PRGRMD STIMJ&PACG IV RX NFS: CPT

## 2025-06-09 PROCEDURE — 93655 ICAR CATH ABLTJ DSCRT ARRHYT: CPT

## 2025-06-09 PROCEDURE — 86901 BLOOD TYPING SEROLOGIC RH(D): CPT

## 2025-06-09 PROCEDURE — 80053 COMPREHEN METABOLIC PANEL: CPT

## 2025-06-09 PROCEDURE — 80048 BASIC METABOLIC PNL TOTAL CA: CPT

## 2025-06-09 RX ADMIN — ATORVASTATIN CALCIUM SCH MG: 40 TABLET, FILM COATED ORAL at 20:20

## 2025-06-09 RX ADMIN — ACETAMINOPHEN PRN MG: 325 TABLET, FILM COATED ORAL at 23:13

## 2025-06-09 RX ADMIN — LIDOCAINE HYDROCHLORIDE ONE ML: 10 INJECTION, SOLUTION INFILTRATION; PERINEURAL at 08:00

## 2025-06-09 RX ADMIN — EPHEDRINE SULFATE STA MG: 50 INJECTION INTRAMUSCULAR; INTRAVENOUS; SUBCUTANEOUS at 11:33

## 2025-06-09 RX ADMIN — POTASSIUM CHLORIDE SCH: 14.9 INJECTION, SOLUTION INTRAVENOUS at 17:08

## 2025-06-09 RX ADMIN — LATANOPROST SCH DROPS: 50 SOLUTION OPHTHALMIC at 20:20

## 2025-06-09 RX ADMIN — HEPARIN SODIUM ONE MLS: 10000 INJECTION, SOLUTION INTRAVENOUS at 07:43

## 2025-06-09 RX ADMIN — METHYLENE BLUE ONE MLS: 10 INJECTION INTRAVENOUS at 07:44

## 2025-06-09 RX ADMIN — ISODIUM CHLORIDE PRN MG: 0.03 SOLUTION RESPIRATORY (INHALATION) at 23:40

## 2025-06-09 RX ADMIN — CEFAZOLIN ONE MLS: 330 INJECTION, POWDER, FOR SOLUTION INTRAMUSCULAR; INTRAVENOUS at 07:44

## 2025-06-09 RX ADMIN — ACETAMINOPHEN ONE: 10 INJECTION, SOLUTION INTRAVENOUS at 17:08

## 2025-06-09 RX ADMIN — POTASSIUM CHLORIDE ONE MLS: 14.9 INJECTION, SOLUTION INTRAVENOUS at 13:15

## 2025-06-09 RX ADMIN — CEFAZOLIN SCH MLS/HR: 330 INJECTION, POWDER, FOR SOLUTION INTRAMUSCULAR; INTRAVENOUS at 12:25

## 2025-06-09 RX ADMIN — IOPAMIDOL ONE ML: 755 INJECTION, SOLUTION INTRAVENOUS at 10:21

## 2025-06-09 RX ADMIN — BUDESONIDE AND FORMOTEROL FUMARATE DIHYDRATE SCH: 160; 4.5 AEROSOL RESPIRATORY (INHALATION) at 19:52

## 2025-06-09 RX ADMIN — ONDANSETRON ONE: 2 INJECTION INTRAMUSCULAR; INTRAVENOUS at 17:08

## 2025-06-09 RX ADMIN — POTASSIUM CHLORIDE ONE MLS: 14.9 INJECTION, SOLUTION INTRAVENOUS at 06:39

## 2025-06-09 RX ADMIN — NICARDIPINE HYDROCHLORIDE ONE: 2.5 INJECTION INTRAVENOUS at 17:09

## 2025-06-09 RX ADMIN — DEXAMETHASONE SODIUM PHOSPHATE ONE: 4 INJECTION, SOLUTION INTRAMUSCULAR; INTRAVENOUS at 17:08

## 2025-06-10 VITALS
DIASTOLIC BLOOD PRESSURE: 70 MMHG | HEART RATE: 67 BPM | RESPIRATION RATE: 18 BRPM | TEMPERATURE: 97.4 F | SYSTOLIC BLOOD PRESSURE: 105 MMHG

## 2025-06-10 RX ADMIN — LOSARTAN POTASSIUM SCH MG: 25 TABLET, FILM COATED ORAL at 09:21

## 2025-06-10 RX ADMIN — METOPROLOL SUCCINATE SCH MG: 25 TABLET, EXTENDED RELEASE ORAL at 09:21

## 2025-06-10 RX ADMIN — RIVAROXABAN SCH MG: 20 TABLET, FILM COATED ORAL at 09:21

## 2025-06-10 RX ADMIN — TAMSULOSIN HYDROCHLORIDE SCH MG: 0.4 CAPSULE ORAL at 09:21

## 2025-06-10 RX ADMIN — FUROSEMIDE SCH MG: 40 TABLET ORAL at 09:21

## 2025-06-10 RX ADMIN — TIOTROPIUM BROMIDE INHALATION SPRAY SCH PUFF: 3.12 SPRAY, METERED RESPIRATORY (INHALATION) at 07:28

## 2025-06-10 RX ADMIN — ASPIRIN 81 MG CHEWABLE TABLET SCH MG: 81 TABLET CHEWABLE at 09:21

## 2025-06-10 RX ADMIN — DAPAGLIFLOZIN SCH MG: 10 TABLET, FILM COATED ORAL at 09:21
